# Patient Record
Sex: FEMALE | Race: ASIAN | NOT HISPANIC OR LATINO | Employment: UNEMPLOYED | URBAN - METROPOLITAN AREA
[De-identification: names, ages, dates, MRNs, and addresses within clinical notes are randomized per-mention and may not be internally consistent; named-entity substitution may affect disease eponyms.]

---

## 2019-08-02 ENCOUNTER — OFFICE VISIT (OUTPATIENT)
Dept: FAMILY MEDICINE | Facility: CLINIC | Age: 49
End: 2019-08-02
Payer: COMMERCIAL

## 2019-08-02 VITALS
OXYGEN SATURATION: 99 % | DIASTOLIC BLOOD PRESSURE: 67 MMHG | TEMPERATURE: 98.4 F | RESPIRATION RATE: 18 BRPM | HEART RATE: 73 BPM | HEIGHT: 56 IN | WEIGHT: 110.4 LBS | BODY MASS INDEX: 24.84 KG/M2 | SYSTOLIC BLOOD PRESSURE: 97 MMHG

## 2019-08-02 DIAGNOSIS — M25.512 CHRONIC PAIN OF BOTH SHOULDERS: ICD-10-CM

## 2019-08-02 DIAGNOSIS — M25.511 CHRONIC PAIN OF BOTH SHOULDERS: ICD-10-CM

## 2019-08-02 DIAGNOSIS — M54.2 NECK PAIN: Primary | ICD-10-CM

## 2019-08-02 DIAGNOSIS — G89.29 CHRONIC PAIN OF BOTH SHOULDERS: ICD-10-CM

## 2019-08-02 PROBLEM — F33.9 RECURRENT MAJOR DEPRESSIVE DISORDER (H): Status: ACTIVE | Noted: 2018-10-12

## 2019-08-02 RX ORDER — CYCLOBENZAPRINE HCL 5 MG
5 TABLET ORAL 3 TIMES DAILY PRN
Qty: 60 TABLET | Refills: 3 | Status: SHIPPED | OUTPATIENT
Start: 2019-08-02 | End: 2021-05-07

## 2019-08-02 RX ORDER — BUPROPION HYDROCHLORIDE 150 MG/1
150 TABLET ORAL
COMMUNITY
Start: 2019-05-20 | End: 2021-05-07

## 2019-08-02 RX ORDER — CITALOPRAM HYDROBROMIDE 20 MG/1
20 TABLET ORAL
COMMUNITY
Start: 2015-03-06 | End: 2021-05-07

## 2019-08-02 RX ORDER — NAPROXEN 250 MG/1
250 TABLET ORAL 2 TIMES DAILY WITH MEALS
Qty: 30 TABLET | Refills: 3 | Status: SHIPPED | OUTPATIENT
Start: 2019-08-02 | End: 2021-05-07

## 2019-08-02 RX ORDER — MULTIVITAMIN WITH IRON
1 TABLET ORAL
COMMUNITY
Start: 2015-03-06 | End: 2021-05-07

## 2019-08-02 ASSESSMENT — ENCOUNTER SYMPTOMS
CONSTIPATION: 1
HEARTBURN: 1
DEPRESSION: 1
MYALGIAS: 1
NECK PAIN: 1
HEADACHES: 1

## 2019-08-02 ASSESSMENT — MIFFLIN-ST. JEOR: SCORE: 981.02

## 2019-08-02 NOTE — NURSING NOTE
Due to patient being non-English speaking/uses sign language, an  was used for this visit. Only for face-to-face interpretation by an external agency, date and length of interpretation can be found on the scanned worksheet.     name: Lisa Hollingsworth  Agency: [employer]  Language: Germaine   Telephone number: [phone]  Type of interpretation: Face-to-face, spoken
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
## 2019-08-02 NOTE — PATIENT INSTRUCTIONS
Your pain is likely from muscle spasms. Start taking 5 mg flexeril up to three times daily for pain. You can also take 250 mg of Naproxen as needed for pain, make sure to take this with meals.     Schedule a follow up appointment with me in 4 weeks. We will discuss your pain and your mood at that time.             If you have any further concerns or wish to schedule another appointment, please call our office at [phone] during normal business hours (8AM-5PM, Monday-Friday)    Thank you for coming to [facility], I look forward to caring for you in the future!    Take care,    Dayne Connolly MD

## 2019-08-07 NOTE — PROGRESS NOTES
I have personally reviewed the history and examination as documented by Dr. Connolly.  I was present during key portions of the visit and agree with the assessment and plan as documented for 48 yr old female here for neck and bilateral shoulder pain. Imaging negative. Trial of meds. If no better, consider labs for PMR. Precautions given. Anticipatory guidance given.     Valentín Capps MD  August 7, 2019  3:29 PM

## 2019-09-04 ENCOUNTER — OFFICE VISIT (OUTPATIENT)
Dept: FAMILY MEDICINE | Facility: CLINIC | Age: 49
End: 2019-09-04
Payer: COMMERCIAL

## 2019-09-04 VITALS
HEIGHT: 56 IN | DIASTOLIC BLOOD PRESSURE: 64 MMHG | RESPIRATION RATE: 14 BRPM | WEIGHT: 109 LBS | OXYGEN SATURATION: 98 % | SYSTOLIC BLOOD PRESSURE: 93 MMHG | HEART RATE: 75 BPM | TEMPERATURE: 97.8 F | BODY MASS INDEX: 24.52 KG/M2

## 2019-09-04 DIAGNOSIS — G89.29 CHRONIC RIGHT SHOULDER PAIN: Primary | ICD-10-CM

## 2019-09-04 DIAGNOSIS — M75.21 BICEPS TENDONITIS, RIGHT: ICD-10-CM

## 2019-09-04 DIAGNOSIS — M25.511 CHRONIC RIGHT SHOULDER PAIN: Primary | ICD-10-CM

## 2019-09-04 LAB — ERYTHROCYTE [SEDIMENTATION RATE] IN BLOOD: 15 MM/HR (ref 0–20)

## 2019-09-04 ASSESSMENT — MIFFLIN-ST. JEOR: SCORE: 982.42

## 2019-09-04 NOTE — PROGRESS NOTES
HPI:       Lise Ross is a 48 year old  female with a past medical history of MDD and chronic right shoulder pain who presents for the following concern:    Right shoulder pain   - Currently on wellbutrin, celexa and flexeril   - Was seen on 8/2 for symptoms. X-ray performed of cervical spine and bilateral shoulders. Noted joint space narrowing of bilateral shoulders with osteophytes. Cervical spine with straightening.   - Recommended trial of flexeril and naproxen and if not improved ESR to rule out PMR  - This right shoulder pain has been present for about 10 years total   - Does have weakness present, though states it is as a result of pain when she tries to use it. Weakness has been going on some time   - Flexeril and naproxen given as last visit were somewhat helpful   - Has done PT in the past, but states that it was not helpful. Still does the exercises at home.   - Notes some weakness in upper legs when she gets up, unchanged over the years though     A [employer]  was used for this visit         PMHX:     Patient Active Problem List   Diagnosis     Recurrent major depressive disorder (H)     Screening for malignant neoplasm of cervix       Current Outpatient Medications   Medication Sig Dispense Refill     buPROPion (WELLBUTRIN XL) 150 MG 24 hr tablet Take 150 mg by mouth       citalopram (CELEXA) 20 MG tablet Take 20 mg by mouth       cyclobenzaprine (FLEXERIL) 5 MG tablet Take 1 tablet (5 mg) by mouth 3 times daily as needed for muscle spasms 60 tablet 3     Multiple vitamin  s TABS Take 1 tablet by mouth       naproxen (NAPROSYN) 250 MG tablet Take 1 tablet (250 mg) by mouth 2 times daily (with meals) 30 tablet 3       Social History     Tobacco Use     Smoking status: Never Smoker     Smokeless tobacco: Never Used   Substance Use Topics     Alcohol use: None     Drug use: None          Allergies   Allergen Reactions     Codeine Other (See Comments)     Trouble breathing, drowsy  Shaky  "and tired       Office Visit on 09/04/2019   Component Date Value Ref Range Status     Sed Rate 09/04/2019 15  0 - 20 mm/hr Final            Review of Systems:       10 point review of systems negative except for noted in HPI             Physical Exam:     Vitals:    09/04/19 1616   BP: 93/64   Pulse: 75   Resp: 14   Temp: 97.8  F (36.6  C)   TempSrc: Oral   SpO2: 98%   Weight: 49.4 kg (109 lb)   Height: 1.422 m (4' 8\")     Body mass index is 24.44 kg/m .    Exam:  Constitutional: healthy, alert and no distress  Neck: Neck supple. No adenopathy. Thyroid symmetric, normal size,  Cardiovascular: Regular rate and rhythm. No murmurs, clicks gallops or rub  Respiratory: Lungs clear to auscultation. No wheezing or crackles present   Abdomen:  Abdomen soft, non-tender. BS normal. No masses, organomegaly  Musculoskeletal: Full ROM of cervical spine. Spurling's negative. Significant decreased ROM both active and passive secondary to pain of right arm. No bony tenderness to palpation. Tenderness to palpation of proximal biceps tendon. Difficult to perform specific maneuvers due to diffuse pain with active movements   Psychiatric: mentation appears normal and affect normal/bright      Assessment and Plan     1. Chronic right shoulder pain  2. Biceps tendonitis, right  Chronic right shoulder pain. Difficult to get full exam given pain and resulting weakness, though did have significant tenderness to palpation over proximal biceps tendon. Recommended continuing naproxen with food and flexeril as needed for pain control given some relief. ESR obtained and wnl, findings also not consistent with PMR. Additionally question if mood disorder may be contributing as well. Regardless, feel she would benefit from trial of PT again and patient agreeable.   - PHYSICAL THERAPY REFERRAL  - Erythrocyte Sed Rate ([employer])    Follow up in 1 month as needed for recheck of shoulder pain     Options for treatment and follow-up care were reviewed " with the patient and/or guardian. Lise Ross and/or guardian engaged in the decision making process and verbalized understanding of the options discussed and agreed with the final plan.    Shirley Garcia DO (PGY3)   Pager #[phone]    Precepted today with: Katiuska Robles MD

## 2019-09-04 NOTE — NURSING NOTE
Due to patient being non-English speaking/uses sign language, an  was used for this visit. Only for face-to-face interpretation by an external agency, date and length of interpretation can be found on the scanned worksheet.     name: Tiffany Mancera  Agency: [employer]  Language: Emong   Telephone number: [phone]  Type of interpretation: Face-to-face, spoken

## 2019-09-04 NOTE — PROGRESS NOTES
Preceptor Attestation:   Patient seen, evaluated and discussed with the resident. I have verified the content of the note, which accurately reflects my assessment of the patient and the plan of care.  Supervising Physician:Katiuska Robles MD  [facility]

## 2019-09-05 ENCOUNTER — AMBULATORY - HEALTHEAST (OUTPATIENT)
Dept: ADMINISTRATIVE | Facility: REHABILITATION | Age: 49
End: 2019-09-05

## 2019-09-05 DIAGNOSIS — G89.29 CHRONIC RIGHT SHOULDER PAIN: ICD-10-CM

## 2019-09-05 DIAGNOSIS — M25.511 CHRONIC RIGHT SHOULDER PAIN: ICD-10-CM

## 2019-09-05 DIAGNOSIS — M75.21 BICEPS TENDINITIS, RIGHT: ICD-10-CM

## 2019-09-05 NOTE — PATIENT INSTRUCTIONS
Referral for :      Physical Therapy   LOCATION/PLACE/Provider :    [facility]   DATE & TIME :    Location will call patient  PHONE :     [phone]  FAX :    [phone]  Appointment made by clinic staff/:    Maryann

## 2019-09-18 ENCOUNTER — OFFICE VISIT (OUTPATIENT)
Dept: FAMILY MEDICINE | Facility: CLINIC | Age: 49
End: 2019-09-18
Payer: COMMERCIAL

## 2019-09-18 ENCOUNTER — AMBULATORY - HEALTHEAST (OUTPATIENT)
Dept: ADMINISTRATIVE | Facility: REHABILITATION | Age: 49
End: 2019-09-18

## 2019-09-18 VITALS
HEIGHT: 57 IN | BODY MASS INDEX: 23.3 KG/M2 | TEMPERATURE: 98 F | WEIGHT: 108 LBS | DIASTOLIC BLOOD PRESSURE: 64 MMHG | RESPIRATION RATE: 16 BRPM | HEART RATE: 67 BPM | OXYGEN SATURATION: 96 % | SYSTOLIC BLOOD PRESSURE: 99 MMHG

## 2019-09-18 DIAGNOSIS — G89.29 CHRONIC RIGHT SHOULDER PAIN: ICD-10-CM

## 2019-09-18 DIAGNOSIS — M25.511 CHRONIC RIGHT SHOULDER PAIN: Primary | ICD-10-CM

## 2019-09-18 DIAGNOSIS — M75.21 BICEPS TENDONITIS, RIGHT: ICD-10-CM

## 2019-09-18 DIAGNOSIS — M25.511 CHRONIC RIGHT SHOULDER PAIN: ICD-10-CM

## 2019-09-18 DIAGNOSIS — M75.01 ADHESIVE CAPSULITIS OF RIGHT SHOULDER: ICD-10-CM

## 2019-09-18 DIAGNOSIS — G89.29 CHRONIC RIGHT SHOULDER PAIN: Primary | ICD-10-CM

## 2019-09-18 ASSESSMENT — MIFFLIN-ST. JEOR: SCORE: 988.88

## 2019-09-18 NOTE — NURSING NOTE
"Chief Complaint   Patient presents with     Follow Up     bilateral but left is getting worse.      Medication Reconciliation     completed.        BP 99/64   Pulse 67   Temp 98  F (36.7  C) (Oral)   Resp 16   Ht 1.44 m (4' 8.69\")   Wt 49 kg (108 lb)   SpO2 96%   BMI 23.62 kg/m          Due to patient being non-English speaking/uses sign language, an  was used for this visit. Only for face-to-face interpretation by an external agency, date and length of interpretation can be found on the scanned worksheet.       name: Fayshandra Martinez  Language: Germaine  Agency: [employer]  Phone number: [phone]  Type of interpretation: Face to Face, spoken    "

## 2019-09-18 NOTE — PROGRESS NOTES
Preceptor Attestation:   Patient seen, evaluated and discussed with the resident. I have verified the content of the note, which accurately reflects my assessment of the patient and the plan of care.  Supervising Physician:Donnie Young MD  [facility]

## 2019-09-18 NOTE — PROGRESS NOTES
HPI:       Lise Ross is a 48 year old  female with a past medical history of MDD and chronic right shoulder pain who presents for the following concern:    Right shoulder pain   - Currently on wellbutrin, celexa and flexeril for medication management of shoulder pain   - Was seen by myself on 9/4 for these symptoms, felt to be partially secondary to biceps tendonitis   - ESR obtained to rule out PMR, and negative   - Recommended return to PT, unfortunately patient did not call to schedule   - Shoulder has not improved since last seen. Has not been doing exercises or stretches   - Describes pain as primarily in her anterior shoulder and worse with movement. Pain is worse at night   - Has been doing massage and acupuncture, which have not been helpful     A [employer]  was used for this visit         PMHX:     Patient Active Problem List   Diagnosis     Recurrent major depressive disorder (H)     Screening for malignant neoplasm of cervix       Current Outpatient Medications   Medication Sig Dispense Refill     buPROPion (WELLBUTRIN XL) 150 MG 24 hr tablet Take 150 mg by mouth       citalopram (CELEXA) 20 MG tablet Take 20 mg by mouth       cyclobenzaprine (FLEXERIL) 5 MG tablet Take 1 tablet (5 mg) by mouth 3 times daily as needed for muscle spasms 60 tablet 3     Multiple vitamin  s TABS Take 1 tablet by mouth       naproxen (NAPROSYN) 250 MG tablet Take 1 tablet (250 mg) by mouth 2 times daily (with meals) 30 tablet 3       Social History     Tobacco Use     Smoking status: Never Smoker     Smokeless tobacco: Never Used   Substance Use Topics     Alcohol use: None     Drug use: None       Allergies   Allergen Reactions     Codeine Other (See Comments)     Trouble breathing, drowsy  Shaky and tired              Review of Systems:       10 point review of systems negative except for noted in HPI             Physical Exam:     Vitals:    09/18/19 1337   BP: 99/64   Pulse: 67   Resp: 16   Temp: 98  F  "(36.7  C)   TempSrc: Oral   SpO2: 96%   Weight: 49 kg (108 lb)   Height: 1.44 m (4' 8.69\")     Body mass index is 23.62 kg/m .    Exam:  Constitutional: healthy, alert and no distress  Cardiovascular: Regular rate and rhythm. No murmurs, clicks gallops or rub  Respiratory: Lungs clear to auscultation. No wheezing or crackles present   Musculoskeletal: Full ROM of cervical spine. Spurling's negative. Significant decreased ROM both active and passive secondary to pain of right arm. No bony tenderness to palpation. Tenderness to palpation of proximal biceps tendon. Difficult to perform specific maneuvers due to diffuse pain with active movements   Psychiatric: mentation appears normal and affect normal/bright    Assessment and Plan     1. Chronic right shoulder pain  2. Biceps tendonitis, right  3. Adhesive capsulitis of right shoulder  Significant decreased ROM secondary to pain both active and passive concerning for adhesive capsulitis. Has had negative x-ray shoulder and cervical spine which were negative. Also with significant tenderness over proximal biceps tendon, so this may be contributing to symptoms as well, however difficult to get full assessment given significant pain with movement. Recommended referral to PT. No radicular symptoms or other red flag symptoms.   - PHYSICAL THERAPY REFERRAL    Follow up in 1 month after PT. If no improvement of pain, may benefit from seeing sports medicine physician    Options for treatment and follow-up care were reviewed with the patient and/or guardian. Lise Ross and/or guardian engaged in the decision making process and verbalized understanding of the options discussed and agreed with the final plan.    Shirley Garcia DO (PGY3)  [facility] Resident  Pager: [phone]      Precepted today with: Donnie Young MD          "

## 2019-09-18 NOTE — PATIENT INSTRUCTIONS
Referral for :     Physical Therapy    LOCATION/PLACE/Provider :    [facility]  DATE & TIME :    Location will call the patient   PHONE :     [phone]  FAX :    [phone]  Appointment made by clinic staff/:    Maryann

## 2021-03-11 ENCOUNTER — OFFICE VISIT (OUTPATIENT)
Dept: FAMILY MEDICINE | Facility: CLINIC | Age: 51
End: 2021-03-11
Payer: COMMERCIAL

## 2021-03-11 VITALS
WEIGHT: 106.6 LBS | OXYGEN SATURATION: 97 % | RESPIRATION RATE: 18 BRPM | HEIGHT: 57 IN | BODY MASS INDEX: 23 KG/M2 | HEART RATE: 79 BPM | DIASTOLIC BLOOD PRESSURE: 68 MMHG | TEMPERATURE: 97.4 F | SYSTOLIC BLOOD PRESSURE: 116 MMHG

## 2021-03-11 DIAGNOSIS — Z12.11 SCREENING FOR COLON CANCER: ICD-10-CM

## 2021-03-11 DIAGNOSIS — K59.01 SLOW TRANSIT CONSTIPATION: Primary | ICD-10-CM

## 2021-03-11 DIAGNOSIS — R10.84 DIFFUSE ABDOMINAL PAIN: ICD-10-CM

## 2021-03-11 PROCEDURE — 99213 OFFICE O/P EST LOW 20 MIN: CPT | Mod: GC | Performed by: STUDENT IN AN ORGANIZED HEALTH CARE EDUCATION/TRAINING PROGRAM

## 2021-03-11 RX ORDER — POLYETHYLENE GLYCOL 3350 17 G/17G
1 POWDER, FOR SOLUTION ORAL DAILY PRN
Qty: 507 G | Refills: 1 | Status: SHIPPED | OUTPATIENT
Start: 2021-03-11 | End: 2021-05-07

## 2021-03-11 RX ORDER — CALCIUM CARBONATE 500 MG/1
1 TABLET, CHEWABLE ORAL 3 TIMES DAILY PRN
Qty: 120 TABLET | Refills: 1 | Status: SHIPPED | OUTPATIENT
Start: 2021-03-11 | End: 2021-06-21

## 2021-03-11 SDOH — HEALTH STABILITY: MENTAL HEALTH: HOW OFTEN DO YOU HAVE A DRINK CONTAINING ALCOHOL?: NEVER

## 2021-03-11 SDOH — HEALTH STABILITY: MENTAL HEALTH: HOW OFTEN DO YOU HAVE 6 OR MORE DRINKS ON ONE OCCASION?: NEVER

## 2021-03-11 SDOH — HEALTH STABILITY: MENTAL HEALTH: HOW MANY STANDARD DRINKS CONTAINING ALCOHOL DO YOU HAVE ON A TYPICAL DAY?: NOT ASKED

## 2021-03-11 ASSESSMENT — MIFFLIN-ST. JEOR: SCORE: 972.53

## 2021-03-11 NOTE — NURSING NOTE
Due to patient being non-English speaking/uses sign language, an  was used for this visit. Only for face-to-face interpretation by an external agency, date and length of interpretation can be found on the scanned worksheet.     name: [id number] Mayra   Agency: [employer]  Language: mEong   Telephone number: IPAD  Type of interpretation: IPAD

## 2021-03-11 NOTE — PATIENT INSTRUCTIONS
Please talk with your daughter about the colonoscopy and call us with what you decide.      Patient Education     Treating Constipation    Constipation is a common and often uncomfortable problem. Constipation means you have bowel movements fewer than 3 times per week. Or that you strain to pass hard, dry stool. It can last a short time. Or it can be a problem that never seems to go away. The good news is that it can often be treated and controlled.  Eat more fiber  One of the best ways to help treat constipation is to increase your fiber intake. You can do this either through diet or by using fiber supplements. Fiber (in whole grains, fruits, and vegetables) adds bulk and absorbs water to soften the stool. This helps the stool pass through the colon more easily. When you increase your fiber intake, do it slowly to prevent side effects such as bloating. Also increase the amount of water that you drink. Eating more of these foods can add fiber to your diet:    High-fiber cereals    Whole grains, bran, and brown rice    Vegetables such as carrots, broccoli, and greens    Fresh fruits (especially apples, pears, and dried fruits such as raisins and apricots)    Nuts and legumes (especially beans such as lentils, kidney beans, and lima beans)  Get physically active  Exercise helps improve the working of your colon which helps ease constipation. Try to get some physical activity every day. If you haven t been active for a while, talk with your healthcare provider before starting again.  Laxatives  Your healthcare provider may suggest an over-the-counter product to help ease your constipation. He or she may suggest the use of bulk-forming agents or laxatives. Laxatives, if used as directed, are common and safe. Follow directions carefully when using them. See your provider for new-onset constipation, or long-term constipation, to rule out other causes such as medicines or thyroid disease.  Jose last reviewed this  educational content on 6/1/2019 2000-2020 [employer]. All rights reserved. This information is not intended as a substitute for professional medical care. Always follow your healthcare professional's instructions.

## 2021-03-11 NOTE — PROGRESS NOTES
"  Subjective:   Lise Ross is a 50 year old year old female who presents to clinic today for the following health issues:    ABDOMINAL PAIN     Onset: 1 year, worsening over the past month    Description:   Character: constant, \"hot\", \"pressure\". Feels a \"lump\" when she presses that moves from one place to another  Location: diffusely across the abdomen  Radiation: Back and Pelvic region    Intensity: moderate to severe severe    Progression of Symptoms:  worsening    Accompanying Signs & Symptoms:  Fever/Chills?: no   Gas/Bloating: YES  Nausea: no   Vomitting: no   Diarrhea?: no   Constipation: yes - describes passing hard balls of stool  Dysuria or Hematuria: no    History:   Trauma: No  Surgery: cholecystectomy and   Previous similar pain: no    Previous tests done: none    Precipitating factors:   Does the pain change with:     Food: YES- some foods cause more pain and burning sensation (spicy and sour foods, beef and chicken)    BM: no     Urination: no     Alleviating factors: massaging the abdomen, medicine (unsure what is is called, was given to her by a friend)    LMP: No LMP recorded. Patient is postmenopausal.     A Critter[employer]  was used for this visit. Yodit Fierro [id number])    PMHX:     Patient Active Problem List   Diagnosis     Recurrent major depressive disorder (H)     Screening for malignant neoplasm of cervix       Current Outpatient Medications   Medication Sig Dispense Refill     buPROPion (WELLBUTRIN XL) 150 MG 24 hr tablet Take 150 mg by mouth       citalopram (CELEXA) 20 MG tablet Take 20 mg by mouth       cyclobenzaprine (FLEXERIL) 5 MG tablet Take 1 tablet (5 mg) by mouth 3 times daily as needed for muscle spasms 60 tablet 3     Multiple vitamin  s TABS Take 1 tablet by mouth       naproxen (NAPROSYN) 250 MG tablet Take 1 tablet (250 mg) by mouth 2 times daily (with meals) 30 tablet 3       Social History     Tobacco Use     Smoking status: Never Smoker     Smokeless " "tobacco: Never Used   Substance Use Topics     Alcohol use: Never     Frequency: Never     Binge frequency: Never     Drug use: Never     Allergies   Allergen Reactions     Codeine Other (See Comments)     Trouble breathing, drowsy  Shaky and tired       Review of Systems:     ROS otherwise negative unless stated above.     Objective:     /68 (BP Location: Right arm, Patient Position: Sitting, Cuff Size: Adult Regular)   Pulse 79   Temp 97.4  F (36.3  C) (Oral)   Resp 18   Ht 1.44 m (4' 8.69\")   Wt 48.4 kg (106 lb 9.6 oz)   SpO2 97%   BMI 23.32 kg/m    Body mass index is 23.32 kg/m .  GENERAL APPEARANCE: healthy, alert and no distress  EYES: Eyes grossly normal to inspection  RESP: lungs clear to auscultation - no rales, rhonchi or wheezes  CV: regular rate and rhythm,  and no murmur, click,  rub or gallop  ABDOMEN: soft, non-distended abdomen with mild tenderness to deep palpation with palpable fullness along the colon  MS: extremities normal- no gross deformities noted  SKIN: no suspicious lesions or rashes  NEURO: Normal gait, sensory exam grossly normal, mentation appears intact and speech normal  PSYCH: mood and affect normal/bright    Assessment & Plan:     1. Slow transit constipation  2. Diffuse abdominal pain  Based on her history of hard stool and description of a palpable \"lump\" that moves across her abdomen, her symptoms seem most consistent with constipation. The slight fullness felt at the site of the colon on exam would also fit with a stool ball. Discussed the importance of drinking adequate water and getting regular physical activity in addition to a trial of Miralax. Many of her symptoms fit with heartburn as well so will have her try PRN Tums for her symptoms. Follow up PRN if symptoms persist.  - polyethylene glycol (MIRALAX) 17 GM/Dose powder; Take 17 g (1 capful) by mouth daily as needed for constipation  Dispense: 507 g; Refill: 1  - calcium carbonate (TUMS) 500 MG chewable " tablet; Take 1 tablet (500 mg) by mouth 3 times daily as needed for heartburn  Dispense: 120 tablet; Refill: 1    3. Screening for colon cancer  Due for age appropriate colorectal cancer screening. Discussed that a colonoscopy would also be able to ascertain whether there is an intraluminal mass/polyp that is contributing to her symptoms. She would like to discuss with her daughter before decided whether or not to move forward with colonoscopy.  - GASTROENTEROLOGY ADULT REF PROCEDURE ONLY; Future    Options for treatment and follow-up care were reviewed with the patient and/or guardian. Lise Castro Cody and/or guardian engaged in the decision making process and verbalized understanding of the options discussed and agreed with the final plan.    Maritza Rodarte MD  [facility] Medicine Resident    Precepted with: Keerthi Hernández DO

## 2021-03-12 NOTE — PROGRESS NOTES
Preceptor Attestation:   Patient seen, evaluated and discussed with the resident. I have verified the content of the note, which accurately reflects my assessment of the patient and the plan of care.  Supervising Physician:Keerthi Hernández DO [facility]

## 2021-04-30 ENCOUNTER — AMBULATORY - HEALTHEAST (OUTPATIENT)
Dept: NURSING | Facility: CLINIC | Age: 51
End: 2021-04-30

## 2021-05-07 ENCOUNTER — OFFICE VISIT (OUTPATIENT)
Dept: FAMILY MEDICINE | Facility: CLINIC | Age: 51
End: 2021-05-07
Payer: COMMERCIAL

## 2021-05-07 VITALS
HEART RATE: 79 BPM | RESPIRATION RATE: 18 BRPM | DIASTOLIC BLOOD PRESSURE: 67 MMHG | WEIGHT: 110 LBS | SYSTOLIC BLOOD PRESSURE: 102 MMHG | OXYGEN SATURATION: 100 % | BODY MASS INDEX: 24.75 KG/M2 | HEIGHT: 56 IN

## 2021-05-07 DIAGNOSIS — Z11.4 SCREENING FOR HIV (HUMAN IMMUNODEFICIENCY VIRUS): ICD-10-CM

## 2021-05-07 DIAGNOSIS — Z12.31 ENCOUNTER FOR SCREENING MAMMOGRAM FOR BREAST CANCER: ICD-10-CM

## 2021-05-07 DIAGNOSIS — F41.9 ANXIETY: ICD-10-CM

## 2021-05-07 DIAGNOSIS — R53.83 FATIGUE, UNSPECIFIED TYPE: ICD-10-CM

## 2021-05-07 DIAGNOSIS — Z00.00 ROUTINE GENERAL MEDICAL EXAMINATION AT A HEALTH CARE FACILITY: Primary | ICD-10-CM

## 2021-05-07 DIAGNOSIS — Z13.220 SCREENING FOR HYPERLIPIDEMIA: ICD-10-CM

## 2021-05-07 DIAGNOSIS — Z83.3 FAMILY HISTORY OF DIABETES MELLITUS: ICD-10-CM

## 2021-05-07 DIAGNOSIS — Z11.59 NEED FOR HEPATITIS C SCREENING TEST: ICD-10-CM

## 2021-05-07 LAB
ERYTHROCYTE [DISTWIDTH] IN BLOOD BY AUTOMATED COUNT: 11.6 % (ref 10–15)
HBA1C MFR BLD: 5.3 % (ref 4.1–5.7)
HCT VFR BLD AUTO: 36.4 % (ref 35–47)
HEMOGLOBIN: 12.5 G/DL (ref 11.7–15.7)
MCH RBC QN AUTO: 30.5 PG (ref 26.5–35)
MCHC RBC AUTO-ENTMCNC: 34.3 G/DL (ref 32–36)
MCV RBC AUTO: 88.8 FL (ref 78–100)
PLATELET # BLD AUTO: 204 10E9/L (ref 150–450)
RBC # BLD AUTO: 4.1 10E12/L (ref 3.8–5.2)
TSH SERPL DL<=0.05 MIU/L-ACNC: 0.7 UIU/ML (ref 0.3–5)
WBC # BLD AUTO: 4.6 10E9/L (ref 4–11)

## 2021-05-07 PROCEDURE — 85027 COMPLETE CBC AUTOMATED: CPT | Performed by: FAMILY MEDICINE

## 2021-05-07 PROCEDURE — 83036 HEMOGLOBIN GLYCOSYLATED A1C: CPT | Performed by: FAMILY MEDICINE

## 2021-05-07 PROCEDURE — 36415 COLL VENOUS BLD VENIPUNCTURE: CPT | Performed by: FAMILY MEDICINE

## 2021-05-07 PROCEDURE — 99396 PREV VISIT EST AGE 40-64: CPT | Performed by: FAMILY MEDICINE

## 2021-05-07 RX ORDER — POLYETHYLENE GLYCOL 3350 17 G/17G
17 POWDER, FOR SOLUTION ORAL DAILY PRN
COMMUNITY
Start: 2021-03-11 | End: 2021-10-18

## 2021-05-07 ASSESSMENT — MIFFLIN-ST. JEOR: SCORE: 981.71

## 2021-05-07 NOTE — PATIENT INSTRUCTIONS
- Follow-up for your tetanus vaccine 2 weeks or more after the COVID-19 vaccine.   - Schedule your mammogram at your earliest convenience.   - We will call you with the results of your blood work.   - Please set up another visit to discuss the next steps in your anxiety and sleep issues.       Preventive Health Recommendations  Female Ages 50 - 64    Yearly exam: See your health care provider every year in order to  o Review health changes.   o Discuss preventive care.    o Review your medicines if your doctor has prescribed any.      Get a Pap test every three years (unless you have an abnormal result and your provider advises testing more often).    If you get Pap tests with HPV test, you only need to test every 5 years, unless you have an abnormal result.     You do not need a Pap test if your uterus was removed (hysterectomy) and you have not had cancer.    You should be tested each year for STDs (sexually transmitted diseases) if you're at risk.     Have a mammogram every 1 to 2 years.    Have a colonoscopy at age 50, or have a yearly FIT test (stool test). These exams screen for colon cancer.      Have a cholesterol test every 5 years, or more often if advised.    Have a diabetes test (fasting glucose) every three years. If you are at risk for diabetes, you should have this test more often.     If you are at risk for osteoporosis (brittle bone disease), think about having a bone density scan (DEXA).    Shots: Get a flu shot each year. Get a tetanus shot every 10 years.    Nutrition:     Eat at least 5 servings of fruits and vegetables each day.    Eat whole-grain bread, whole-wheat pasta and brown rice instead of white grains and rice.    Get adequate Calcium and Vitamin D.     Lifestyle    Exercise at least 150 minutes a week (30 minutes a day, 5 days a week). This will help you control your weight and prevent disease.    Limit alcohol to one drink per day.    No smoking.     Wear sunscreen to prevent skin  cancer.     See your dentist every six months for an exam and cleaning.    See your eye doctor every 1 to 2 years.  Referral for :     Mammogram    LOCATION/PLACE/Provider :    [facility]   DATE & TIME :    Faxed, location will reach out   PHONE :     [phone]  FAX :    [phone]  Appointment made by clinic staff/:    Maryann

## 2021-05-07 NOTE — PROGRESS NOTES
Female Physical Note    Due to language barrier, an  was present during the history-taking and subsequent discussion (and for part of the physical exam) with this patient.  A [employer]  was used for this visit.    Concerns today:     She had her cholesterol checked at another clinic, [facility], and they didn't start a medication. They recommended lifestyle changes and to recheck it in a year.     FH of diabetes in her mother. Would like to be screened.     History of iron deficiency anemia. Last checked 4-5 years ago and no longer taking iron supplements. Feels fatigued a lot. Very low energy. She is not sleeping well. Gradual onset of fatigue. She is post-menopausal for 2-3 years. Hot flashes related to this transition and its a bit better recently. She has a history of anxiety and thinks that this might be the cause of her symptoms. Previously treated with citalopram and bupropion with minimal benefit. Is taking melatonin, which works sometimes. She frequently wakes up in the middle of the night, though, and feels very anxious.    Sometimes she has a strange heart sensation and she's not sure what is causing. She hasn't noticed it happening at any particular time. Sometimes it's once per month, sometimes it is more often. No known triggers - doesn't seem to be exertional in nature.  It feels like an irritated pain, not like heartburn. It tends to last for seconds. No palpitations. No associated shortness of breath. She hasn't noticed if it is reproducible. No known FH of early heart disease.     ROS:   CONSTITUTIONAL: Fatigue as above, no unexpected change in weight  SKIN: Dry skin, no worrisome rashes, no worrisome moles, no worrisome lesions  EYES: no acute vision problems or changes  ENT: no ear problems, no mouth problems, no throat problems  RESP: no significant cough, no shortness of breath  CV: See HPI. no palpitations, no new or worsening peripheral edema, orthopnea, PND.  GI: no  nausea, no vomiting, no constipation, no diarrhea    Sexually Active: No  Sexual concerns: No  Contraception:not needed    Menarche:No LMP recorded. Patient is postmenopausal.  STD History: Neg  Last Pap Smear Date:  was normal with negative HPV (see problem list)  Abnormal Pap History: None    Patient Active Problem List   Diagnosis     Recurrent major depressive disorder (H)     Screening for malignant neoplasm of cervix       Current Outpatient Medications   Medication Sig Dispense Refill     calcium carbonate (TUMS) 500 MG chewable tablet Take 1 tablet (500 mg) by mouth 3 times daily as needed for heartburn 120 tablet 1       Past Medical History:   Diagnosis Date     Depressive disorder         Family History     Problem (# of Occurrences) Relation (Name,Age of Onset)    Cancer (1) Mother    Diabetes (1) Mother       Negative family history of: Hypertension, Albinism, Heart Disease, Coronary Artery Disease, Asthma          Problem List Medication List and Allergy List were reviewed.    Patient is an established patient of this clinic..    Social History     Tobacco Use     Smoking status: Never Smoker     Smokeless tobacco: Never Used   Substance Use Topics     Alcohol use: Never     Frequency: Never     Binge frequency: Never     Single  Children ? Yes, 4 kids. Lives with her youngest child. Not working outside the home. Has grandchildren in [place].     Has anyone hurt you physically, for example by pushing, hitting, slapping or kicking you or forcing you to have sex? Denies  Do you feel threatened or controlled by a partner, ex-partner or anyone in your life? Denies    RISK BEHAVIORS AND HEALTHY HABITS:  Tobacco Use/Smoking: None  Illicit Drug Use: None  Do you use alcohol? No  Diet (5-7 servings of fruits/veg daily): Yes   Exercise (30 min accumulated most days):No  Dental Care: No   Calcium 1500 mg/d:  No  Seat Belt Use: Yes     Cholesterol Level (>44 yo or at risk):  Recommended and patient  "accepted testing., Pap/HPV cotest every 5 years for women 30-65   Date done 2017  Result(s) NIL with neg. HPV, next due in 2022 and HIV screening:  Recommended and patient accepted testing.  Colon CA Screening (>50-75 ):  Recommended and patient accepted testing. and Breast CA Screening (>39 yo or 10 y before 1st degree relative diagnosis): Recommended and patient accepted testing.      Immunization History   Administered Date(s) Administered     COVID-19,PF,Pfizer 04/30/2021     MMR 08/01/1988     OPV, trivalent, live 08/01/1988, 10/19/1988, 05/08/1989     TD (ADULT, 7+) 08/01/1988, 10/19/1988, 05/08/1989    Reviewed Immunization Record Today    EXAMINATION:   /67   Pulse 79   Resp 18   Ht 1.43 m (4' 8.3\")   Wt 49.9 kg (110 lb)   SpO2 100%   BMI 24.40 kg/m    GENERAL: healthy, alert and no distress  EYES: Eyes grossly normal to inspection, extraocular movements - intact, and PERRL  HENT: ear canals- normal; TMs- normal; Nose- normal; Mouth- no ulcers, no lesions  NECK: no tenderness, no adenopathy, no asymmetry, no masses, no stiffness; thyroid- normal to palpation  RESP: lungs clear to auscultation - no rales, no rhonchi, no wheezes  BREAST: no masses, no tenderness, no nipple discharge, no palpable axillary masses or adenopathy  CV: regular rates and rhythm, normal S1 S2, no S3 or S4 and no murmur, no click or rub -  ABDOMEN: soft, no tenderness, no  hepatosplenomegaly, no masses, normal bowel sounds  MS: extremities- no gross deformities noted, no edema  SKIN: no suspicious lesions, no rashes  NEURO: strength and tone- normal, sensory exam- grossly normal, mentation- intact, speech- normal  BACK: no CVA tenderness, no paralumbar tenderness  - deferred, no concerns  RECTAL- deferred, no concerns  PSYCH: Alert and oriented times 3; speech- coherent , normal rate and volume; able to articulate logical thoughts, able to abstract reason, no tangential thoughts, no hallucinations or delusions, affect- " normal  LYMPHATICS: ant. cervical- normal, post. cervical- normal, axillary- normal, supraclavicular- normal    ASSESSMENT/PLAN:  1. Routine general medical examination at a health care facility  Here for a routine physical exam. Due for the following preventive screening tests today. Colonoscopy previously ordered in 2021 (see visit from March).  - Orders as below.     2. Encounter for screening mammogram for breast cancer  Due for routine screening mammography. Normal breast exam today.  - MA SCREENING DIGITAL BILAT; Future    3. Screening for HIV (human immunodeficiency virus)  Low risk. Routine screening.  - HIV Screening; Future    4. Need for hepatitis C screening test  Low risk. Routine screening.  - Hepatitis C Screen Reflex to HCV RNA Quant and Genotype; Future    5. Screening for hyperlipidemia  Previous abnormal lipid panel at outside facility. Has worked on lifestyle factors. Will repeat today.   - Lipid panel reflex to direct LDL Fasting; Future    6. Family history of diabetes mellitus  Mother with DM II. No symptoms of concern. BMI in normal range.  - Hemoglobin A1c ([facility])    7. Fatigue, unspecified type  Associated insomnia, suspect contributions from july- and post-menopausal hormonal fluctuations and anxiety. Lab work-up today. Continue melatonin. Consider starting selective serotonin reuptake inhibitor in follow-up - discussed briefly today.   - CBC with Plt (P )  - Vitamin D 25-Hydroxy ([facility])  - TSH  Sensitive ([facility])    8. Anxiety:   Longstanding anxiety symptoms. Presentation sounds more like panic attacks but did not have adequate time to characterize today. Has been treated with citalopram and bupropion previously for comorbid depression symptoms.   - Briefly discussed medication management and potential benefits for sleep.   - Close follow-up after lab work-up above to better characterize symptoms and to explore potential treatment options.    Other preventive health  maintenance:  Due for Tdap and Zoster vaccines but in the middle of the COVID vaccine series. Will defer for follow-up visit.    Elicia Kraus MD

## 2021-05-10 ENCOUNTER — RECORDS - HEALTHEAST (OUTPATIENT)
Dept: SCHEDULING | Facility: CLINIC | Age: 51
End: 2021-05-10

## 2021-05-10 ENCOUNTER — RECORDS - HEALTHEAST (OUTPATIENT)
Dept: ADMINISTRATIVE | Facility: OTHER | Age: 51
End: 2021-05-10

## 2021-05-10 DIAGNOSIS — Z12.31 VISIT FOR SCREENING MAMMOGRAM: ICD-10-CM

## 2021-05-10 DIAGNOSIS — Z13.220 SCREENING FOR HYPERLIPIDEMIA: ICD-10-CM

## 2021-05-10 DIAGNOSIS — Z11.4 SCREENING FOR HIV (HUMAN IMMUNODEFICIENCY VIRUS): Primary | ICD-10-CM

## 2021-05-10 DIAGNOSIS — Z11.59 NEED FOR HEPATITIS C SCREENING TEST: ICD-10-CM

## 2021-05-10 LAB
25(OH)D3 SERPL-MCNC: 22.7 NG/ML (ref 30–80)
CHOLEST SERPL-MCNC: 186 MG/DL
FASTING?: NO
HDLC SERPL-MCNC: 42 MG/DL
HIV 1+2 AB+HIV1 P24 AG SERPL QL IA: NEGATIVE
LDLC SERPL CALC-MCNC: 88 MG/DL
TRIGL SERPL-MCNC: 280 MG/DL

## 2021-05-10 PROCEDURE — 36415 COLL VENOUS BLD VENIPUNCTURE: CPT | Performed by: FAMILY MEDICINE

## 2021-05-10 NOTE — PROGRESS NOTES
Labs ordered incorrectly at physical last week. Can we please call to see if these can be added on to the sample that Lise provided at [facility] now that they are ordered correctly? If not, we can follow-up at her next visit.     Thanks!    Elicia Kruas MD

## 2021-05-11 ENCOUNTER — TELEPHONE (OUTPATIENT)
Dept: FAMILY MEDICINE | Facility: CLINIC | Age: 51
End: 2021-05-11

## 2021-05-11 DIAGNOSIS — R74.8 LOW SERUM HDL: ICD-10-CM

## 2021-05-11 DIAGNOSIS — E55.9 HYPOVITAMINOSIS D: Primary | ICD-10-CM

## 2021-05-11 LAB — HCV AB SER QL: NEGATIVE

## 2021-05-11 RX ORDER — SWAB
2 SWAB, NON-MEDICATED MISCELLANEOUS DAILY
Qty: 60 CAPSULE | Refills: 11 | Status: SHIPPED | OUTPATIENT
Start: 2021-05-11 | End: 2022-05-06

## 2021-05-11 NOTE — TELEPHONE ENCOUNTER
"Can we please call Lise to relay the following results and recommendations:    1. Her screening test for diabetes was normal/negative.   2. Her tests for HIV and Hepatitis C were negative/normal as expected.   3. Her cholesterol testing is slightly abnormal. Her triglycerides are elevated and her \"good\" cholesterol, or HDL, is low. We can talk in follow-up about ways to improve her cholesterol levels.   4. She does not have anemia or any abnormalities with her thyroid function to explain her fatigue. However, her vitamin D level is low, which may be contributing to her low energy. I recommend starting a daily supplement of 800 international unit(s) of cholecalciferol (a type of vitamin D) and we can recheck this later in the year. I sent this prescription to her pharmacy.     Thanks!    Elicia Kraus MD         "

## 2021-05-13 NOTE — TELEPHONE ENCOUNTER
Called pt via language [employer], Natali ID#[id number]. Gave results to patient and has a follow-up already scheduled for 6/20/2021.

## 2021-05-21 ENCOUNTER — AMBULATORY - HEALTHEAST (OUTPATIENT)
Dept: NURSING | Facility: CLINIC | Age: 51
End: 2021-05-21

## 2021-05-30 ENCOUNTER — RECORDS - HEALTHEAST (OUTPATIENT)
Dept: ADMINISTRATIVE | Facility: CLINIC | Age: 51
End: 2021-05-30

## 2021-06-21 ENCOUNTER — OFFICE VISIT (OUTPATIENT)
Dept: FAMILY MEDICINE | Facility: CLINIC | Age: 51
End: 2021-06-21
Payer: COMMERCIAL

## 2021-06-21 VITALS
WEIGHT: 110 LBS | HEIGHT: 56 IN | HEART RATE: 65 BPM | TEMPERATURE: 98 F | OXYGEN SATURATION: 98 % | SYSTOLIC BLOOD PRESSURE: 97 MMHG | DIASTOLIC BLOOD PRESSURE: 66 MMHG | BODY MASS INDEX: 24.75 KG/M2 | RESPIRATION RATE: 18 BRPM

## 2021-06-21 DIAGNOSIS — E55.9 HYPOVITAMINOSIS D: ICD-10-CM

## 2021-06-21 DIAGNOSIS — R06.09 DYSPNEA ON EXERTION: ICD-10-CM

## 2021-06-21 DIAGNOSIS — R74.8 LOW SERUM HDL: Primary | ICD-10-CM

## 2021-06-21 DIAGNOSIS — Z00.00 HEALTHCARE MAINTENANCE: ICD-10-CM

## 2021-06-21 PROCEDURE — 99215 OFFICE O/P EST HI 40 MIN: CPT | Performed by: FAMILY MEDICINE

## 2021-06-21 ASSESSMENT — MIFFLIN-ST. JEOR: SCORE: 981.71

## 2021-06-21 ASSESSMENT — PATIENT HEALTH QUESTIONNAIRE - PHQ9: SUM OF ALL RESPONSES TO PHQ QUESTIONS 1-9: 6

## 2021-06-21 NOTE — NURSING NOTE
Due to patient being non-English speaking/uses sign language, an  was used for this visit. Only for face-to-face interpretation by an external agency, date and length of interpretation can be found on the scanned worksheet.     name: Alexys Barbosa  Agency: [employer]  Language: Germaine   Telephone number: [phone]  Type of interpretation: Telephone, spoken

## 2021-06-21 NOTE — PATIENT INSTRUCTIONS
- Work on gradually increasing exercise to 30-40 minutes, 4-5 days per week.   - If you start to develop worsening or more frequent shortness of breath or chest tightness while increasing your activity, follow up immediately.   - Let's repeat your cholesterol test with a fasting sample in a few months to recheck this.     Patient Education     Lifestyle Changes to Control Cholesterol  You can control your cholesterol through diet, exercise, weight management, quitting smoking, stress management, and taking your medicines right. These things can also lower your risk for cardiovascular disease.     Eating healthy  Your healthcare provider will give you information on diet changes you may need to make. Your provider may recommend that you see a registered dietitian for help with diet changes. Changes may include:     Cutting back on the amount of fat and cholesterol in your meals    Eating less salt (sodium). This is especially important if you also have high blood pressure.    Eating more fresh vegetables and fruits    Eating lean proteins such as fish, poultry, beans, and peas    Eating less red meat and processed meats    Using low-fat dairy products    Using vegetable and nut oils in limited amounts    Limiting how many sweets and processed foods like chips, cookies, and baked goods that you eat     Limiting how many sugar-sweetened beverages you drink    Limiting how often you eat out    Limiting alcohol  Getting exercise  Regular exercise is a good way to help your body control cholesterol. Regular exercise can help in many ways. It can:     Raise your good cholesterol    Help lower your bad cholesterol    Let blood flow better through your body    Give more oxygen to your muscles and tissues    Help you manage your weight    Help your heart pump better    Lower your blood pressure  Your healthcare provider may recommend that you get more physical activity if you haven't been active. Your provider may recommend  that you get moderate to vigorous physical activity for at least 40 minutes each day. You should do this for at least 3 to 4 days each week. A few examples of moderate to vigorous activity are:     Walking at a brisk pace. This is about 3 to 4 miles per hour.    Jogging or running    Swimming or water aerobics    Hiking    Dancing    Martial arts    Tennis    Riding a bicycle or stationary bike    Dancing  Managing your weight  If you are overweight or obese, your healthcare provider will work with you to help you lose weight and lower your BMI (body mass index). Making diet changes and getting more physical activity can help. Changing your diet will help you lose weight more easily than adding exercise.   Quitting smoking  Smoking and other tobacco use can raise cholesterol and make it harder to control. Quitting is tough. But millions of people have given up tobacco for good. You can quit, too! Think about some of the reasons below to quit smoking. Do any of them make you think twice about your smoking habit?   Stop smoking because it:    Keeps your cholesterol high, even if you make all the other changes you re supposed to    Damages your body. It especially harms your heart, lungs, skin, and blood vessels.    Makes you more likely to have a heart attack (acute myocardial infarction), stroke, or cancer    Stains your teeth    Makes your skin, clothes, and breath smell bad    Costs a lot of money  Ask your healthcare provider for medicines or nicotine replacement products to help you quit.   Controlling stress   Learn ways to control stress. This will help you deal with stress in your home and work life. Controlling stress can greatly lower your risk of getting cardiovascular disease.   Making the most of medicines  Healthy eating and exercise are a good start to keeping your cholesterol down. But you may need some extra help from medicine. If your doctor prescribes medicine, be sure to take it exactly as  directed. Remember:     Tell your healthcare provider about all other medicines you take. This includes vitamins and herbs.    Tell your healthcare provider if you have any side effects after starting to take a medicine. Examples of side effects to watch for include muscle aches, weakness, blurred vision, rust-colored urine, yellowing of eyes or skin (jaundice), abdominal pain, and headache.    Don t skip a dose or stop taking your medicine because you feel better or because your cholesterol numbers go down. Never stop taking your medicine unless your healthcare provider has told you it s OK.    Ask your healthcare provider if you have any questions about your medicines.  Risk groups  Some people may need to take medicines called statins to control their cholesterol. This is in addition to eating a healthy diet and getting regular exercise. The dose or intensity of the statin depends on your risk factors.   Statins can help you stay healthy. They can also help prevent a heart attack or stroke. You may need to take a statin if you are in one of the intermediate, high, or very-high risk groups or if you have a multiple risk (enhancers) conditions.   Calculating a risk score is done looking at certain risk factors that are considered as an increased risk for atherosclerosis. The risk calculator tool looks at:     Ages between 40-79 years of age    Gender    Race    Blood pressure (systolic and diastolic measurements)    Total cholesterol, good cholesterol (HDL), and bad cholesterol (LDL) levels    History of diabetes    Current or past smoking history    Treatment for high blood pressure    Treatment of cholesterol with a statin    Current use of aspirin  If you are uncertain about your risk factors, you and your provider may decide to proceed with a scan to determine a coronary artery calcium (CAC) score. Depending on the results of this scan you and your provider may decide on whether starting a medicine for  cholesterol is the best treatment option for you.   Talk with your healthcare provider about your treatment goals. Make sure you understand why these goals are important, based on your own health history and your family history of heart disease or high cholesterol.   Make a plan to have regular cholesterol checks. You may need to fast before getting this test. Also ask your provider about any side effects your medicines may cause. Let your provider know about any side effects you have. You may need to take more than one medicine to reach the cholesterol goals that you and your provider decide on.   [employer] last reviewed this educational content on 7/1/2019 2000-2021 [employer]. All rights reserved. This information is not intended as a substitute for professional medical care. Always follow your healthcare professional's instructions.

## 2021-06-21 NOTE — PROGRESS NOTES
Assessment & Plan     Low serum HDL  Lipids in May 2021, non-fasting sample with elevated triglycerides (280) and low HDL (42), but normal range LDL (88). 10 year ASCVD risk of 1.0%.   - Discussed results in detail today.   - Reviewed healthy lifestyle changes to promote improvement in lipid panel.   - Advised repeating lipids with a fasting sample in follow-up in the next 2-3 months.    Hypovitaminosis D  Started on 800 international unit(s) cholecalciferol daily in May 2021 for a value of 22.7 ng/mL  - Continue supplement. Will reassess with next blood draw.    Dyspnea on exertion  About one year of sudden onset dyspnea with more intense exertion accompanied by chest tightness and heavy breathing that improves quickly with rest. Able to tolerate exercise at a more moderate pace. No smoking history or previous asthma diagnoses. Recent evaluation for anemia and thyroid dysfunction was unremarkable. No previous cardiac disease history, other signs/sx of CHF or valvular disease. Low ASCVD risk factor with elevated TGs and low HDL her only risk factors. Differential includes exercise-induced asthma and deconditioning. Angina far less likely. Anxiety may also be contributing.  - Reviewed potential causes of symptoms briefly.   - Advised close monitoring for triggers and slow expansion of exercise intensity and frequency.  - Close follow-up if worsening frequency or intensity. Would then obtain spirometry and EKG with low threshold for stress testing.    Healthcare maintenance  Due for Tdap and Zoster. Prefers to wait on vaccines given recent arm soreness from COVID-19 vaccine.   - Advised Tdap in follow-up and Zoster at her pharmacy.      42 minutes spent on the date of the encounter doing chart review, patient visit and documentation        Return in about 2 months (around 8/21/2021) for Follow up in 2 months to recheck cholesterol and check back on your breathing symptoms.  Or sooner as symptoms indicate.  "    Elicia Kraus MD  [facility]    Elias Lezama is a 50 year old who presents for the following health issues    Due to language barrier, an  was present during the history-taking and subsequent discussion (and for part of the physical exam) with this patient.    HPI     1. Follow-up lab results: Here to discuss her lab results from last time. She is concerned with her cholesterol lab values because she is worried that it might be a problem with her heart from the fats in her blood. Is taking her vitamin D and feels perhaps a bit less tired.     2. Dyspnea on exertion: When she is rushing and pushes herself with activity, she develops some shortness of breath and chest tightness. For example, when she is going up one flight of stairs quickly or rushing around her house to prepare some food for her son. Symptoms first started in 2020. She is not sure of the frequency of her symptoms; they have improved since she stopped pushing herself.  She is able to exercise walking 40 minutes at a time, 2-3 times per week. She is also active with gardening without any symptoms.  Episodes last about 5 minutes and involve more heavy breathing, improving with rest. No coughing or wheezing. No nausea but her stomach feels unsettled sometimes when she is short of breath. No diaphoresis. No lower leg swelling, orthopnea or PND.    3. Due for Tdap: She has chronic left shoulder pain from a previous injury. She got her 2nd COVID-19 vaccine in this arm last month and her arm is still quite sore in the area. It is feeling better but she prefers to delay her tetanus vaccine for now.           Objective    BP 97/66 (BP Location: Right arm, Patient Position: Sitting, Cuff Size: Adult Regular)   Pulse 65   Temp 98  F (36.7  C) (Oral)   Resp 18   Ht 1.43 m (4' 8.3\")   Wt 49.9 kg (110 lb)   SpO2 98%   BMI 24.40 kg/m    Body mass index is 24.4 kg/m .  Physical Exam   GENERAL: healthy, alert " and no distress  RESP: lungs clear to auscultation - no rales, rhonchi or wheezes  CV: regular rate and rhythm, normal S1 S2, no S3 or S4, no murmur, click or rub, no peripheral edema and peripheral pulses strong  MS: no gross musculoskeletal defects noted, no edema

## 2021-07-25 ENCOUNTER — HEALTH MAINTENANCE LETTER (OUTPATIENT)
Age: 51
End: 2021-07-25

## 2021-08-10 ENCOUNTER — TELEPHONE (OUTPATIENT)
Dept: FAMILY MEDICINE | Facility: CLINIC | Age: 51
End: 2021-08-10

## 2021-08-11 NOTE — TELEPHONE ENCOUNTER
Patient called back and spoke to Rupa about the referrals, states that she is going to wait. Does have an upcoming appt with PCP.

## 2021-09-13 ENCOUNTER — OFFICE VISIT (OUTPATIENT)
Dept: FAMILY MEDICINE | Facility: CLINIC | Age: 51
End: 2021-09-13
Payer: COMMERCIAL

## 2021-09-13 VITALS
OXYGEN SATURATION: 99 % | DIASTOLIC BLOOD PRESSURE: 65 MMHG | TEMPERATURE: 98 F | HEIGHT: 56 IN | BODY MASS INDEX: 24.3 KG/M2 | WEIGHT: 108 LBS | RESPIRATION RATE: 16 BRPM | SYSTOLIC BLOOD PRESSURE: 100 MMHG | HEART RATE: 60 BPM

## 2021-09-13 DIAGNOSIS — R06.02 SHORTNESS OF BREATH: Primary | ICD-10-CM

## 2021-09-13 DIAGNOSIS — Z12.12 ENCOUNTER FOR COLORECTAL CANCER SCREENING: ICD-10-CM

## 2021-09-13 DIAGNOSIS — E55.9 HYPOVITAMINOSIS D: ICD-10-CM

## 2021-09-13 DIAGNOSIS — R74.8 LOW SERUM HDL: ICD-10-CM

## 2021-09-13 DIAGNOSIS — Z12.11 ENCOUNTER FOR COLORECTAL CANCER SCREENING: ICD-10-CM

## 2021-09-13 DIAGNOSIS — M54.50 ACUTE RIGHT-SIDED LOW BACK PAIN WITHOUT SCIATICA: ICD-10-CM

## 2021-09-13 LAB
CHOLEST SERPL-MCNC: 226 MG/DL
FASTING STATUS PATIENT QL REPORTED: NO
HDLC SERPL-MCNC: 56 MG/DL
LDLC SERPL CALC-MCNC: 121 MG/DL
TRIGL SERPL-MCNC: 246 MG/DL

## 2021-09-13 PROCEDURE — 36415 COLL VENOUS BLD VENIPUNCTURE: CPT | Performed by: FAMILY MEDICINE

## 2021-09-13 PROCEDURE — 82306 VITAMIN D 25 HYDROXY: CPT | Performed by: FAMILY MEDICINE

## 2021-09-13 PROCEDURE — 99214 OFFICE O/P EST MOD 30 MIN: CPT | Performed by: FAMILY MEDICINE

## 2021-09-13 PROCEDURE — 80061 LIPID PANEL: CPT | Performed by: FAMILY MEDICINE

## 2021-09-13 ASSESSMENT — MIFFLIN-ST. JEOR: SCORE: 975.75

## 2021-09-13 NOTE — NURSING NOTE
Due to patient being non-English speaking/uses sign language, an  was used for this visit. Only for face-to-face interpretation by an external agency, date and length of interpretation can be found on the scanned worksheet.     name: Angeline Hannah   Agency: [employer]  Language: Germaine   Telephone number: [phone]  Type of interpretation: Face-to-face, spoken

## 2021-09-13 NOTE — PROGRESS NOTES
Assessment & Plan     Shortness of breath  Significant symptomatic improvement in the last few months and often associated with stress/anxiety per patient. Recent evaluation for anemia and thyroid dysfunction was unremarkable. No previous cardiac disease history, other signs/sx of CHF or valvular disease. Low ASCVD risk factor with elevated TGs and low HDL her only risk factors.  - Encouraged ongoing work with therapy for support.   - Reviewed warning signs that should prompt urgent follow-up, including signs/sx of angina.    Hypovitaminosis D  Started on 800 international unit(s) cholecalciferol daily in May 2021 for a value of 22.7 ng/mL. Due for reassessment today.   - Vitamin D deficiency screening; Future  - Vitamin D deficiency screening    Low serum HDL  Lipids in May 2021, non-fasting sample with elevated triglycerides (280) and low HDL (42), but normal range LDL (88). 10 year ASCVD risk of 1.0%. Here today to repeat lipids with a fasting sample.   - Lipid panel; Future  - Lipid panel  - Discussed lifestyle changes to improve cholesterol profile in depth.    Acute right-sided low back pain without sciatica  Acute onset of low back pain after stooping forward when picking beans in the field. Suspect mechanical etiology, likely a lumbar strain. No red flag symptoms.  - Conservative management with heat, NSAIDs, gentle stretching and activity as tolerated.   - Close follow-up if not improving or worsening.    Encounter for colorectal cancer screening  Previously had agreed to a colonoscopy but now prefers FIT testing initially. No previous blood in her stools or FH of colorectal cancer.  - Fecal colorectal cancer screen FIT; Future    34 minutes spent on the date of the encounter doing chart review, patient visit and documentation        Encouraged consideration of Tdap and influenza vaccines along with completing mammography. Patient to consider. Follow-up as needed.    Elicia Kraus MD  [facility] Griffin Mai is a 50 year old who presents for the following health issues     Due to language barrier, an  was present during the history-taking and subsequent discussion (and for part of the physical exam) with this patient.      HPI     Here today to check back on her labs. She was advised to return to repeat her cholesterol testing with a fasting sample due to elevated triglycerides. She has been walking regularly 30 minutes either at the gym or outside. She is getting this done 4-5 times per week. She tends to eat one large meal each day but is trying to eat more frequently.    She reports that her breathing has improved significantly. She notices it happening more when she is feeling stressed, like when she is running late or something else is off. She attributes it to being in a hurry or anxious. She spoke with a therapist and is doing some breathing exercises when she's experiencing the symptoms with significant benefit. She hasn't noticed the shortness of breath with exertion and denies any chest pain, lower extremity edema, PND or orthopnea.     She is also experiencing some intermittent lower back pain that has disrupted her physical activity for the past few days. It happened after bending over to pick beans with her sister at her farm last week. Heat has been helping. She hasn't tried any medications. No radicular component or changes to bladder or bowel function.         Objective    /65   Pulse 60   Temp 98  F (36.7  C) (Oral)   Resp 16   Ht 1.435 m (4' 8.5\")   Wt 49 kg (108 lb)   SpO2 99%   BMI 23.79 kg/m    Body mass index is 23.79 kg/m .  Physical Exam   GENERAL: healthy, alert and no distress  RESP: normal rate and effort  MS: no gross musculoskeletal defects noted, no edema, full active ROM of the back  NEURO: Moving all extremities equally. Normal gross coordination and gait.          "

## 2021-09-13 NOTE — PATIENT INSTRUCTIONS
- We will call you with the results of your blood tests.     - Get your tetanus shot done as soon as possible. Also consider getting your flu shot this year.    - Keep working on eating healthy, plenty of fresh vegetables and fruits and whole grains like oatmeal and brown rice. Also work on incorporating lean proteins like beans, tofu, fish or seafood, and chicken or turkey.     - Keep exercising 30 minutes of moderate activity 5 days per week.    - Send back the stool test as a screen for colorectal cancer.     - Set up your mammogram as soon as possible.

## 2021-09-14 PROBLEM — E78.1 HYPERTRIGLYCERIDEMIA: Status: ACTIVE | Noted: 2021-05-11

## 2021-09-14 LAB — DEPRECATED CALCIDIOL+CALCIFEROL SERPL-MC: 54 UG/L (ref 30–80)

## 2021-09-19 ENCOUNTER — HEALTH MAINTENANCE LETTER (OUTPATIENT)
Age: 51
End: 2021-09-19

## 2021-09-20 ENCOUNTER — LAB (OUTPATIENT)
Dept: LAB | Facility: CLINIC | Age: 51
End: 2021-09-20
Payer: COMMERCIAL

## 2021-09-20 DIAGNOSIS — Z12.11 ENCOUNTER FOR COLORECTAL CANCER SCREENING: ICD-10-CM

## 2021-09-20 DIAGNOSIS — Z12.12 ENCOUNTER FOR COLORECTAL CANCER SCREENING: ICD-10-CM

## 2021-09-20 LAB — HEMOCCULT STL QL IA: NEGATIVE

## 2021-09-20 PROCEDURE — 82274 ASSAY TEST FOR BLOOD FECAL: CPT

## 2021-09-22 ENCOUNTER — TELEPHONE (OUTPATIENT)
Dept: FAMILY MEDICINE | Facility: CLINIC | Age: 51
End: 2021-09-22

## 2021-09-22 NOTE — TELEPHONE ENCOUNTER
Patient was returning call back to find out what her colorectal FIT result was. Informed result negative. Recommend repeat in one year per Dr Kraus. Triage unable to document in result note, will route to Dominga Pratt CMA to inform her of the completed action. Bre IVAN

## 2021-10-18 ENCOUNTER — OFFICE VISIT (OUTPATIENT)
Dept: FAMILY MEDICINE | Facility: CLINIC | Age: 51
End: 2021-10-18
Payer: COMMERCIAL

## 2021-10-18 VITALS
RESPIRATION RATE: 20 BRPM | BODY MASS INDEX: 23.08 KG/M2 | DIASTOLIC BLOOD PRESSURE: 65 MMHG | TEMPERATURE: 98 F | SYSTOLIC BLOOD PRESSURE: 100 MMHG | HEART RATE: 65 BPM | WEIGHT: 107 LBS | OXYGEN SATURATION: 99 % | HEIGHT: 57 IN

## 2021-10-18 DIAGNOSIS — R10.12 LEFT UPPER QUADRANT PAIN: Primary | ICD-10-CM

## 2021-10-18 DIAGNOSIS — R10.13 EPIGASTRIC PAIN: ICD-10-CM

## 2021-10-18 LAB
ALBUMIN SERPL-MCNC: 4 G/DL (ref 3.5–5)
ALP SERPL-CCNC: 74 U/L (ref 45–120)
ALT SERPL W P-5'-P-CCNC: 18 U/L (ref 0–45)
ANION GAP SERPL CALCULATED.3IONS-SCNC: 10 MMOL/L (ref 5–18)
AST SERPL W P-5'-P-CCNC: 18 U/L (ref 0–40)
BILIRUB SERPL-MCNC: 0.6 MG/DL (ref 0–1)
BUN SERPL-MCNC: 12 MG/DL (ref 8–22)
CALCIUM SERPL-MCNC: 9.7 MG/DL (ref 8.5–10.5)
CHLORIDE BLD-SCNC: 104 MMOL/L (ref 98–107)
CO2 SERPL-SCNC: 25 MMOL/L (ref 22–31)
CREAT SERPL-MCNC: 0.73 MG/DL (ref 0.6–1.1)
GFR SERPL CREATININE-BSD FRML MDRD: >90 ML/MIN/1.73M2
GLUCOSE BLD-MCNC: 93 MG/DL (ref 70–125)
LIPASE SERPL-CCNC: 17 U/L (ref 0–52)
POTASSIUM BLD-SCNC: 3.9 MMOL/L (ref 3.5–5)
PROT SERPL-MCNC: 7.3 G/DL (ref 6–8)
SODIUM SERPL-SCNC: 139 MMOL/L (ref 136–145)

## 2021-10-18 PROCEDURE — 83690 ASSAY OF LIPASE: CPT | Performed by: FAMILY MEDICINE

## 2021-10-18 PROCEDURE — 99214 OFFICE O/P EST MOD 30 MIN: CPT | Mod: 25 | Performed by: FAMILY MEDICINE

## 2021-10-18 PROCEDURE — 80053 COMPREHEN METABOLIC PANEL: CPT | Performed by: FAMILY MEDICINE

## 2021-10-18 PROCEDURE — 36415 COLL VENOUS BLD VENIPUNCTURE: CPT | Performed by: FAMILY MEDICINE

## 2021-10-18 ASSESSMENT — MIFFLIN-ST. JEOR: SCORE: 971.29

## 2021-10-18 NOTE — NURSING NOTE
Due to patient being non-English speaking/uses sign language, an  was used for this visit. Only for face-to-face interpretation by an external agency, date and length of interpretation can be found on the scanned worksheet.     name: Angeline Hannah  Agency: [employer]  Language: Germaine   Telephone number: [phone]   Type of interpretation: Telephone, spoken

## 2021-10-18 NOTE — PROGRESS NOTES
Assessment & Plan     Left upper quadrant pain  Epigastric pain  Episodically worsening epigastric and LUQ abdominal pain that is triggered by acidic foods. Symptoms most suggestive of PUD vs. Gastritis. Pancreatitis is also on the differential. No exertional component to suggest cardiac etiology. Musculoskeletal exam is benign in this distribution. Neg. H.pylori testing earlier this year.  - Trial of omeprazole (PRILOSEC) 20 MG DR capsule; Take 1 capsule (20 mg) by mouth daily  - Reviewed potential triggers to avoid.   - Comprehensive metabolic panel; Future  - Comprehensive metabolic panel  - Lipase; Future    36 minutes spent on the date of the encounter doing chart review, review of outside records, patient visit and documentation        Close follow up in the next 4 weeks or sooner if worsening symptoms.     Elicia Kraus MD  [facility]    Elias Lezama is a 50 year old who presents for the following health issues     Due to language barrier, an  was present during the history-taking and subsequent discussion (and for part of the physical exam) with this patient.    HPI   Lise is here with left side/back pain. She's had this pain intermittently in the past, but it is worse for the past month or so. It is constantly present and just waxes and wanes in severity. Worse when she doesn't sleep well but she has also noticed that it certain foods are triggers like acidic foods. However, even when avoiding these she continues to have pain. No radiation but she does note some involvement of her left upper quadrant abdominal area. She describes it as a hot or burning sensation. Better when she eats plain food and drinks a lot of water. She has tried Tums in the past without benefit.     She is worried that it is her kidneys causing this discomfort. FH of kidney disease in her mother in old age that caused difficulty voiding and required catheterization. She's not sure of  "the cause of this. She denies any dysuria, urinary frequency or urgency, hematuria, or lower abdominal pain. No fevers/chills, body aches, nausea/vomiting or changes to her stool including melena or hematochezia. No associated rashes. No dysphagia or unexpected weight loss. No recent NSAID use or steroid use. No regular alcohol use.    No known history of H.pylori and she was tested in April 2021 for this at [facility] with a negative result. Liver testing was also normal at that time. No renal panel assessments in the past year.         Objective    /65   Pulse 65   Temp 98  F (36.7  C)   Resp 20   Ht 1.435 m (4' 8.5\")   Wt 48.5 kg (107 lb)   SpO2 99%   BMI 23.57 kg/m    Body mass index is 23.57 kg/m .  Physical Exam   GENERAL: healthy, alert and no distress  RESP: lungs clear to auscultation - no rales, rhonchi or wheezes, normal rate and effort  CV: regular rate and rhythm, normal S1 S2, no S3 or S4, no murmur, click or rub, peripheral pulses strong  ABDOMEN: soft, mild tenderness to palpation over epigastrium and left upper quadrant, no hepatosplenomegaly, no masses and bowel sounds normal  MS: no gross musculoskeletal defects noted, no edema  SKIN: no suspicious lesions or rashes  BACK: no CVA tenderness bilaterally or tenderness to palpation over paraspinal musculature        "

## 2021-10-18 NOTE — LETTER
October 19, 2021      Lise Ross  [address] [place] [address]        Dear ,    We are writing to inform you of your test results.    Your test results fall within the expected range(s) or remain unchanged from previous results.  Please continue with current treatment plan.    Resulted Orders   Comprehensive metabolic panel   Result Value Ref Range    Sodium 139 136 - 145 mmol/L    Potassium 3.9 3.5 - 5.0 mmol/L    Chloride 104 98 - 107 mmol/L    Carbon Dioxide (CO2) 25 22 - 31 mmol/L    Anion Gap 10 5 - 18 mmol/L    Urea Nitrogen 12 8 - 22 mg/dL    Creatinine 0.73 0.60 - 1.10 mg/dL    Calcium 9.7 8.5 - 10.5 mg/dL    Glucose 93 70 - 125 mg/dL    Alkaline Phosphatase 74 45 - 120 U/L    AST 18 0 - 40 U/L    ALT 18 0 - 45 U/L    Protein Total 7.3 6.0 - 8.0 g/dL    Albumin 4.0 3.5 - 5.0 g/dL    Bilirubin Total 0.6 0.0 - 1.0 mg/dL    GFR Estimate >90 >60 mL/min/1.73m2      Comment:      As of July 11, 2021, eGFR is calculated by the CKD-EPI creatinine equation, without race adjustment. eGFR can be influenced by muscle mass, exercise, and diet. The reported eGFR is an estimation only and is only applicable if the renal function is stable.   Lipase   Result Value Ref Range    Lipase 17 0 - 52 U/L       If you have any questions or concerns, please call the clinic at the number listed above.       Sincerely,      Elicia Kraus MD

## 2021-10-18 NOTE — PATIENT INSTRUCTIONS
- We will call you with the results of the blood tests later this week.   - Start taking the omeprazole medicine once daily in the morning before eating.     - Follow-up in 4 weeks to check back on your symptoms, or sooner if worsening.

## 2022-03-14 ENCOUNTER — OFFICE VISIT (OUTPATIENT)
Dept: FAMILY MEDICINE | Facility: CLINIC | Age: 52
End: 2022-03-14
Payer: COMMERCIAL

## 2022-03-14 VITALS
BODY MASS INDEX: 25 KG/M2 | SYSTOLIC BLOOD PRESSURE: 116 MMHG | OXYGEN SATURATION: 96 % | HEIGHT: 56 IN | RESPIRATION RATE: 12 BRPM | WEIGHT: 111.12 LBS | DIASTOLIC BLOOD PRESSURE: 72 MMHG | HEART RATE: 76 BPM | TEMPERATURE: 98 F

## 2022-03-14 DIAGNOSIS — R10.84 DIFFUSE ABDOMINAL PAIN: ICD-10-CM

## 2022-03-14 DIAGNOSIS — R30.0 DYSURIA: ICD-10-CM

## 2022-03-14 DIAGNOSIS — R10.2 PELVIC PAIN IN FEMALE: ICD-10-CM

## 2022-03-14 DIAGNOSIS — R10.13 EPIGASTRIC PAIN: Primary | ICD-10-CM

## 2022-03-14 LAB
ALBUMIN UR-MCNC: NEGATIVE MG/DL
APPEARANCE UR: CLEAR
BACTERIA #/AREA URNS HPF: ABNORMAL /HPF
BILIRUB UR QL STRIP: NEGATIVE
COLOR UR AUTO: YELLOW
GLUCOSE UR STRIP-MCNC: NEGATIVE MG/DL
HGB UR QL STRIP: NEGATIVE
KETONES UR STRIP-MCNC: NEGATIVE MG/DL
LEUKOCYTE ESTERASE UR QL STRIP: ABNORMAL
NITRATE UR QL: NEGATIVE
PH UR STRIP: 6 [PH] (ref 5–7)
RBC #/AREA URNS AUTO: ABNORMAL /HPF
SP GR UR STRIP: <=1.005 (ref 1–1.03)
SQUAMOUS #/AREA URNS AUTO: ABNORMAL /LPF
UROBILINOGEN UR STRIP-ACNC: 0.2 E.U./DL
WBC #/AREA URNS AUTO: ABNORMAL /HPF

## 2022-03-14 PROCEDURE — 81001 URINALYSIS AUTO W/SCOPE: CPT | Performed by: STUDENT IN AN ORGANIZED HEALTH CARE EDUCATION/TRAINING PROGRAM

## 2022-03-14 PROCEDURE — 99214 OFFICE O/P EST MOD 30 MIN: CPT | Mod: GC | Performed by: STUDENT IN AN ORGANIZED HEALTH CARE EDUCATION/TRAINING PROGRAM

## 2022-03-14 RX ORDER — OMEPRAZOLE 40 MG/1
40 CAPSULE, DELAYED RELEASE ORAL DAILY
Qty: 30 CAPSULE | Refills: 3 | Status: SHIPPED | OUTPATIENT
Start: 2022-03-14

## 2022-03-14 NOTE — PROGRESS NOTES
Assessment and Plan     Epigastric Pain:  Lower Abdominal Pain:  Epigastric that radiates to back at times, worse with acidy/spicy foods. Also has diffuse lower abdominal/ upper pelvic discomfort. Some dysuria. Abd not TTP. No diarrhea. Negative H Pylori last year. Similar to previous epigastric pain, and responded well to PPI. Given new lower abdominal pain, obtained UA, which was negative. So unlikely UTI. Will obtain pelvic US.   - UA, negative   - Pelvic US  - Refilled Omeprazole  - Follow-up 1 month      Options for treatment and follow-up care were reviewed with the patient and/or guardian. Lise Ross and/or guardian engaged in the decision making process and verbalized understanding of the options discussed and agreed with the final plan.    Rolo Cowart DO, MBA  [facility], PGY-3    Precepted patient with Dr. Elicia Kraus       HPI:   Lise Ross is a 51 year old female who presents to clinic today for   Chief Complaint   Patient presents with     Abdominal Pain     Ongoing stomach pain and burning radial to her back and lower abdominal causing patient to unable to sleep     Shoulder Pain     Left shoulder pain radial to neck and head     Recheck Medication     Like to see if there is other available medicine for stomach pain     Dizziness     Blood Draw     Requesting multiple lab test done       ABd Pain:  - Long time, but worse over past week   - Burning, epigastric, travels to back. Lower abd as well, diffusely  - difficulty sleeping due to pain  - Worsens with PO sometimes, spicy  - primary diet of oatmeal recently as only thing doesn't irritate stomach  - omeprazole helped previously 5 months ago prescribed. Ran out 2 months ago  - dysuria past day       Denies Fever, Chest Pain, shortness of breath, changes in vision/hearing/GI/diet, numbness/tingling, or any other concerns.         PMHX:   Active Problems List  Patient  "Active Problem List   Diagnosis     Recurrent major depressive disorder (H)     Screening for malignant neoplasm of cervix     Anxiety     Fatigue, unspecified type     Family history of diabetes mellitus     Hypertriglyceridemia     Hypovitaminosis D       Current Medications  Current Outpatient Medications   Medication Sig Dispense Refill     Acetaminophen (TYLENOL PO)        omeprazole (PRILOSEC) 20 MG DR capsule Take 1 capsule (20 mg) by mouth daily 30 capsule 1     vitamin D3 (CHOLECALCIFEROL) 10 MCG (400 UNIT) capsule Take 2 capsules (800 Units) by mouth daily 60 capsule 11       Social History  Social History     Tobacco Use     Smoking status: Never Smoker     Smokeless tobacco: Never Used   Substance Use Topics     Alcohol use: Never     Drug use: Never     History   Drug Use Unknown       Family History  Family History   Problem Relation Age of Onset     Diabetes Mother      Cancer Mother      Hypertension No family hx of      Albinism No family hx of      Heart Disease No family hx of      Coronary Artery Disease No family hx of      Asthma No family hx of        Allergies  Allergies   Allergen Reactions     Codeine Other (See Comments)     Trouble breathing, drowsy  Shaky and tired              Physical Exam:     Vitals:    03/14/22 1136   BP: 116/72   BP Location: Left arm   Patient Position: Sitting   Cuff Size: Adult Regular   Pulse: 76   Resp: 12   Temp: 98  F (36.7  C)   TempSrc: Oral   SpO2: 96%   Weight: 50.4 kg (111 lb 1.9 oz)   Height: 1.43 m (4' 8.3\")     Body mass index is 24.65 kg/m .    GENERAL APPEARANCE: alert, appears stated age, no acute distress  HEENT: Eyes grossly normal to inspection, nares normal, and mouth and throat without erythema, ulcers, or lesions  RESP: lungs clear to auscultation - no rales, rhonchi, or wheezes  CV: regular rate and rhythm, no murmur, click, rub, or gallop  ABDOMEN: soft, nontender to palpation  MSK: extremities normal, no gross deformities noted, no lower " extremity edema  SKIN: no suspicious lesions or rashes   PSYCH: mood and affect normal/bright

## 2022-03-23 NOTE — PROGRESS NOTES
Preceptor Attestation:  Patient's case reviewed and discussed with the resident, Nelson Cowart MD, and I personally evaluated the patient. I agree with written assessment and plan of care.    Supervising Physician:  Elicia Kraus MD   [facility]

## 2022-04-26 ENCOUNTER — TELEPHONE (OUTPATIENT)
Dept: FAMILY MEDICINE | Facility: CLINIC | Age: 52
End: 2022-04-26
Payer: COMMERCIAL

## 2022-04-26 NOTE — TELEPHONE ENCOUNTER
On review of our patient panels,  this patient is due for a clinic appointment. Please help arrange to schedule an appointment for -Cervical Cancer Screening  -Wellness (Physical) Visit  in 2-3 Months.    Elicia Kraus MD

## 2022-04-27 ENCOUNTER — APPOINTMENT (OUTPATIENT)
Dept: INTERPRETER SERVICES | Facility: CLINIC | Age: 52
End: 2022-04-27
Payer: COMMERCIAL

## 2022-06-26 ENCOUNTER — HEALTH MAINTENANCE LETTER (OUTPATIENT)
Age: 52
End: 2022-06-26

## 2022-07-22 NOTE — RESULT ENCOUNTER NOTE
SPINE PATIENTS - NEW PROTOCOL PREVISIT    RECORDS RECEIVED FROM: Internal   REASON FOR VISIT: Cervical and LUMBAR   Date of Appt: 09/02/2022   NOTES (FOR ALL VISITS) STATUS DETAILS   OFFICE NOTE from referring provider Internal 07/21/2022 Dr Storey [facility]    OFFICE NOTE from other specialist Internal 07/07/2022 Dr Bailey [facility]    DISCHARGE SUMMARY from hospital N/A    DISCHARGE REPORT from ER N/A    EMG REPORT N/A    MEDICATION LIST N/A    IMAGING  (FOR ALL VISITS)     MRI (HEAD, NECK, SPINE) Internal 08/08/2022 cervical spine  07/06/2022 lumbar spine   XRAY (SPINE) *NEUROSURGERY* N/A    CT (HEAD, NECK, SPINE) N/A        Discussed results with patient during clinic visit.

## 2022-08-21 ENCOUNTER — HEALTH MAINTENANCE LETTER (OUTPATIENT)
Age: 52
End: 2022-08-21

## 2022-11-08 ENCOUNTER — TELEPHONE (OUTPATIENT)
Dept: FAMILY MEDICINE | Facility: CLINIC | Age: 52
End: 2022-11-08

## 2022-11-08 NOTE — TELEPHONE ENCOUNTER
"Call and left message for patient to give us a call back. If patient call back, Please help schedule patient for these appt.       \"On review of our patient panels,  this patient is due for a clinic appointment. Please help arrange to schedule an appointment for -Breast Cancer Screening  -Colon Cancer Screening  -Cervical Cancer Screening  -Wellness (Physical) Visit  in 4 Weeks.\"  "

## 2022-11-08 NOTE — TELEPHONE ENCOUNTER
On review of our patient panels,  this patient is due for a clinic appointment. Please help arrange to schedule an appointment for -Breast Cancer Screening  -Colon Cancer Screening  -Cervical Cancer Screening  -Wellness (Physical) Visit  in 4 Weeks.    Thanks!    Elicia Kraus MD

## 2022-11-21 ENCOUNTER — HEALTH MAINTENANCE LETTER (OUTPATIENT)
Age: 52
End: 2022-11-21

## 2022-11-25 ENCOUNTER — TELEPHONE (OUTPATIENT)
Dept: FAMILY MEDICINE | Facility: CLINIC | Age: 52
End: 2022-11-25

## 2022-11-25 NOTE — TELEPHONE ENCOUNTER
On review of our patient panels,  this patient is due for a clinic appointment. Please help arrange to schedule an appointment for -Breast Cancer Screening  -Colon Cancer Screening  -Cervical Cancer Screening  -Wellness (Physical) Visit  in the next 4 Weeks.    Thanks!    Elicia Kraus MD

## 2023-01-29 ENCOUNTER — HOSPITAL ENCOUNTER (EMERGENCY)
Facility: HOSPITAL | Age: 53
Discharge: HOME | End: 2023-01-29
Attending: FAMILY MEDICINE | Admitting: FAMILY MEDICINE
Payer: COMMERCIAL

## 2023-01-29 ENCOUNTER — APPOINTMENT (OUTPATIENT)
Dept: CT IMAGING | Facility: HOSPITAL | Age: 53
End: 2023-01-29
Attending: FAMILY MEDICINE
Payer: COMMERCIAL

## 2023-01-29 VITALS
WEIGHT: 112.6 LBS | SYSTOLIC BLOOD PRESSURE: 120 MMHG | OXYGEN SATURATION: 99 % | BODY MASS INDEX: 25.33 KG/M2 | RESPIRATION RATE: 18 BRPM | DIASTOLIC BLOOD PRESSURE: 76 MMHG | HEIGHT: 56 IN | HEART RATE: 70 BPM | TEMPERATURE: 98.3 F

## 2023-01-29 DIAGNOSIS — K59.00 CONSTIPATION, UNSPECIFIED CONSTIPATION TYPE: ICD-10-CM

## 2023-01-29 DIAGNOSIS — R30.0 DYSURIA: ICD-10-CM

## 2023-01-29 LAB
ALBUMIN UR-MCNC: NEGATIVE MG/DL
ANION GAP SERPL CALCULATED.3IONS-SCNC: 10 MMOL/L (ref 7–15)
APPEARANCE UR: CLEAR
BASOPHILS # BLD AUTO: 0 10E3/UL (ref 0–0.2)
BASOPHILS NFR BLD AUTO: 1 %
BILIRUB UR QL STRIP: NEGATIVE
BUN SERPL-MCNC: 5.9 MG/DL (ref 6–20)
CALCIUM SERPL-MCNC: 9.3 MG/DL (ref 8.6–10)
CHLORIDE SERPL-SCNC: 101 MMOL/L (ref 98–107)
COLOR UR AUTO: COLORLESS
CREAT SERPL-MCNC: 0.59 MG/DL (ref 0.51–0.95)
DEPRECATED HCO3 PLAS-SCNC: 26 MMOL/L (ref 22–29)
EOSINOPHIL # BLD AUTO: 0.1 10E3/UL (ref 0–0.7)
EOSINOPHIL NFR BLD AUTO: 1 %
ERYTHROCYTE [DISTWIDTH] IN BLOOD BY AUTOMATED COUNT: 11.8 % (ref 10–15)
GFR SERPL CREATININE-BSD FRML MDRD: >90 ML/MIN/1.73M2
GLUCOSE SERPL-MCNC: 104 MG/DL (ref 70–99)
GLUCOSE UR STRIP-MCNC: NEGATIVE MG/DL
HCT VFR BLD AUTO: 38.5 % (ref 35–47)
HGB BLD-MCNC: 13.2 G/DL (ref 11.7–15.7)
HGB UR QL STRIP: NEGATIVE
IMM GRANULOCYTES # BLD: 0 10E3/UL
IMM GRANULOCYTES NFR BLD: 0 %
KETONES UR STRIP-MCNC: NEGATIVE MG/DL
LEUKOCYTE ESTERASE UR QL STRIP: NEGATIVE
LYMPHOCYTES # BLD AUTO: 1.7 10E3/UL (ref 0.8–5.3)
LYMPHOCYTES NFR BLD AUTO: 33 %
MCH RBC QN AUTO: 30.8 PG (ref 26.5–33)
MCHC RBC AUTO-ENTMCNC: 34.3 G/DL (ref 31.5–36.5)
MCV RBC AUTO: 90 FL (ref 78–100)
MONOCYTES # BLD AUTO: 0.4 10E3/UL (ref 0–1.3)
MONOCYTES NFR BLD AUTO: 7 %
NEUTROPHILS # BLD AUTO: 3.1 10E3/UL (ref 1.6–8.3)
NEUTROPHILS NFR BLD AUTO: 58 %
NITRATE UR QL: NEGATIVE
NRBC # BLD AUTO: 0 10E3/UL
NRBC BLD AUTO-RTO: 0 /100
PH UR STRIP: 5.5 [PH] (ref 5–7)
PLATELET # BLD AUTO: 201 10E3/UL (ref 150–450)
POTASSIUM SERPL-SCNC: 3.5 MMOL/L (ref 3.4–5.3)
RBC # BLD AUTO: 4.28 10E6/UL (ref 3.8–5.2)
RBC URINE: <1 /HPF
SODIUM SERPL-SCNC: 137 MMOL/L (ref 136–145)
SP GR UR STRIP: 1 (ref 1–1.03)
UROBILINOGEN UR STRIP-MCNC: <2 MG/DL
WBC # BLD AUTO: 5.3 10E3/UL (ref 4–11)
WBC URINE: 0 /HPF

## 2023-01-29 PROCEDURE — 85025 COMPLETE CBC W/AUTO DIFF WBC: CPT | Performed by: FAMILY MEDICINE

## 2023-01-29 PROCEDURE — 36415 COLL VENOUS BLD VENIPUNCTURE: CPT | Performed by: FAMILY MEDICINE

## 2023-01-29 PROCEDURE — 99285 EMERGENCY DEPT VISIT HI MDM: CPT | Mod: 25

## 2023-01-29 PROCEDURE — 74176 CT ABD & PELVIS W/O CONTRAST: CPT

## 2023-01-29 PROCEDURE — 80048 BASIC METABOLIC PNL TOTAL CA: CPT | Performed by: FAMILY MEDICINE

## 2023-01-29 PROCEDURE — 250N000011 HC RX IP 250 OP 636: Performed by: FAMILY MEDICINE

## 2023-01-29 PROCEDURE — 81001 URINALYSIS AUTO W/SCOPE: CPT | Performed by: STUDENT IN AN ORGANIZED HEALTH CARE EDUCATION/TRAINING PROGRAM

## 2023-01-29 PROCEDURE — 96374 THER/PROPH/DIAG INJ IV PUSH: CPT

## 2023-01-29 RX ORDER — PHENAZOPYRIDINE HYDROCHLORIDE 100 MG/1
100 TABLET, FILM COATED ORAL 3 TIMES DAILY PRN
Qty: 6 TABLET | Refills: 0 | Status: SHIPPED | OUTPATIENT
Start: 2023-01-29

## 2023-01-29 RX ORDER — DICYCLOMINE HYDROCHLORIDE 10 MG/1
10 CAPSULE ORAL 4 TIMES DAILY PRN
Qty: 12 CAPSULE | Refills: 0 | Status: SHIPPED | OUTPATIENT
Start: 2023-01-29

## 2023-01-29 RX ORDER — DOCUSATE SODIUM 100 MG/1
100 CAPSULE, LIQUID FILLED ORAL 2 TIMES DAILY
Qty: 14 CAPSULE | Refills: 0 | Status: SHIPPED | OUTPATIENT
Start: 2023-01-29 | End: 2023-02-05

## 2023-01-29 RX ORDER — KETOROLAC TROMETHAMINE 15 MG/ML
15 INJECTION, SOLUTION INTRAMUSCULAR; INTRAVENOUS ONCE
Status: COMPLETED | OUTPATIENT
Start: 2023-01-29 | End: 2023-01-29

## 2023-01-29 RX ADMIN — KETOROLAC TROMETHAMINE 15 MG: 15 INJECTION, SOLUTION INTRAMUSCULAR; INTRAVENOUS at 10:22

## 2023-01-29 ASSESSMENT — ENCOUNTER SYMPTOMS
CHILLS: 0
FEVER: 0
DYSURIA: 1
FLANK PAIN: 1
HEMATURIA: 0
VOMITING: 0
ABDOMINAL PAIN: 1

## 2023-01-29 ASSESSMENT — ACTIVITIES OF DAILY LIVING (ADL): ADLS_ACUITY_SCORE: 35

## 2023-01-29 NOTE — ED TRIAGE NOTES
"Pt presents with lower right bad pain that began yesterday. Pt states there is a 'hot and burning\" feeling when she urinates. Denies fever, afebrile at this time.      Triage Assessment     Row Name 01/29/23 0947       Triage Assessment (Adult)    Airway WDL WDL       Respiratory WDL    Respiratory WDL WDL       Skin Circulation/Temperature WDL    Skin Circulation/Temperature WDL WDL       Cardiac WDL    Cardiac WDL WDL       Peripheral/Neurovascular WDL    Peripheral Neurovascular WDL WDL       Cognitive/Neuro/Behavioral WDL    Cognitive/Neuro/Behavioral WDL WDL              "
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
## 2023-01-30 ENCOUNTER — PATIENT OUTREACH (OUTPATIENT)
Dept: CARE COORDINATION | Facility: CLINIC | Age: 53
End: 2023-01-30
Payer: COMMERCIAL

## 2023-01-30 NOTE — PROGRESS NOTES
Clinic Care Coordination Contact    Follow Up Progress Note      Assessment: The pt was recently in the ED, I called to check up on the pt and help the pt setup a ED follow up. The pt was at [facility] for abdominal and back pain. I called and talked to the pt, pt stated that she is doing better. She stated that she is going to wait and see. She stated that if she needs a follow up, she will call the clinic.       Care Gaps:    Health Maintenance Due   Topic Date Due     ADVANCE CARE PLANNING  Never done     DEPRESSION ACTION PLAN  Never done     MAMMO SCREENING  Never done     HEPATITIS B IMMUNIZATION (1 of 3 - 3-dose series) Never done     PAP  Never done     DTAP/TDAP/TD IMMUNIZATION (4 - Td or Tdap) 05/08/1999     ZOSTER IMMUNIZATION (1 of 2) Never done     COVID-19 Vaccine (3 - Booster for Pfizer series) 07/16/2021     PHQ-9  12/21/2021     YEARLY PREVENTIVE VISIT  05/07/2022     INFLUENZA VACCINE (1) Never done     COLORECTAL CANCER SCREENING  09/20/2022           Care Plans      Intervention/Education provided during outreach:               Plan:     Care Coordinator will follow up in

## 2023-02-13 ENCOUNTER — OFFICE VISIT (OUTPATIENT)
Dept: FAMILY MEDICINE | Facility: CLINIC | Age: 53
End: 2023-02-13
Payer: COMMERCIAL

## 2023-02-13 VITALS
WEIGHT: 111 LBS | HEART RATE: 73 BPM | BODY MASS INDEX: 24.89 KG/M2 | SYSTOLIC BLOOD PRESSURE: 108 MMHG | TEMPERATURE: 98 F | RESPIRATION RATE: 20 BRPM | DIASTOLIC BLOOD PRESSURE: 67 MMHG | OXYGEN SATURATION: 97 %

## 2023-02-13 DIAGNOSIS — G47.00 INSOMNIA, UNSPECIFIED TYPE: Primary | ICD-10-CM

## 2023-02-13 DIAGNOSIS — F33.1 MODERATE EPISODE OF RECURRENT MAJOR DEPRESSIVE DISORDER (H): ICD-10-CM

## 2023-02-13 DIAGNOSIS — Z12.4 SCREENING FOR MALIGNANT NEOPLASM OF CERVIX: ICD-10-CM

## 2023-02-13 DIAGNOSIS — Z00.00 PREVENTATIVE HEALTH CARE: ICD-10-CM

## 2023-02-13 PROCEDURE — 87624 HPV HI-RISK TYP POOLED RSLT: CPT | Performed by: FAMILY MEDICINE

## 2023-02-13 PROCEDURE — 99215 OFFICE O/P EST HI 40 MIN: CPT | Performed by: FAMILY MEDICINE

## 2023-02-13 PROCEDURE — G0145 SCR C/V CYTO,THINLAYER,RESCR: HCPCS | Performed by: FAMILY MEDICINE

## 2023-02-13 RX ORDER — ESCITALOPRAM OXALATE 5 MG/1
5 TABLET ORAL DAILY
Qty: 60 TABLET | Refills: 0 | Status: SHIPPED | OUTPATIENT
Start: 2023-02-13

## 2023-02-13 ASSESSMENT — ENCOUNTER SYMPTOMS
JOINT SWELLING: 0
SHORTNESS OF BREATH: 0
WEAKNESS: 0
ABDOMINAL PAIN: 1
ARTHRALGIAS: 0
HEMATURIA: 0
FREQUENCY: 0
DYSURIA: 0
EYE PAIN: 0
NAUSEA: 0
HEARTBURN: 0
BREAST MASS: 0
CONSTIPATION: 0
HEADACHES: 0
HEMATOCHEZIA: 0
PARESTHESIAS: 0
MYALGIAS: 0
SORE THROAT: 0
COUGH: 0
DIZZINESS: 0
NERVOUS/ANXIOUS: 1
PALPITATIONS: 0
FEVER: 0
CHILLS: 0
DIARRHEA: 0

## 2023-02-13 ASSESSMENT — PATIENT HEALTH QUESTIONNAIRE - PHQ9
SUM OF ALL RESPONSES TO PHQ QUESTIONS 1-9: 12
10. IF YOU CHECKED OFF ANY PROBLEMS, HOW DIFFICULT HAVE THESE PROBLEMS MADE IT FOR YOU TO DO YOUR WORK, TAKE CARE OF THINGS AT HOME, OR GET ALONG WITH OTHER PEOPLE: SOMEWHAT DIFFICULT
SUM OF ALL RESPONSES TO PHQ QUESTIONS 1-9: 12

## 2023-02-13 NOTE — PATIENT INSTRUCTIONS
- I'd strongly recommend restarting therapy to help with your symptoms and insomnia. We will reach out to coordinate this for you.     - Start escitalopram 5 mg at bedtime. Take this every day. If doing okay on the medication, we can increase to 10 mg in two weeks.     - Continue to work on reaching out to others, setting up activities, getting time outside, and eating regular meals.     - Follow up in 2 weeks to check back on your symptoms.

## 2023-02-13 NOTE — PROGRESS NOTES
"  Assessment & Plan     Insomnia, unspecified type  Suspect that underlying mood, and possibly anxiety disorder, is a major contributor. Issues with initiation and maintenance. Not responding to over the counter melatonin.   - Adult Mental Health  Referral; Future  - Begin escitalopram (LEXAPRO) 5 MG tablet; Take 1 tablet (5 mg) by mouth daily; starting with very low dose given concerns for potential side effects.    Moderate episode of recurrent major depressive disorder (H)  History of major depression. PHQ-9 of 12 today. Positive for passive SI but no intent or specific plans. May also have a component of anxiety.  - Adult Mental Health  Referral; Future  - escitalopram (LEXAPRO) 5 MG tablet; Take 1 tablet (5 mg) by mouth daily  - Encouraged taking time for herself each day, regular social events, physical activity, and time outside.   - Contracted for safety.   - Follow up in 2 weeks - sooner if needed for any worsening symptoms or inability to tolerate the Lexapro.     Screening for malignant neoplasm of cervix  Previous normal pap smears. Last pap was NILM and HPV negative in 2017. Routine screening one year overdue today with co-testing.  - Pap screen with HPV - recommended age 30 - 65 years    Preventive health care  Did not have adequate time to complete a physical and all preventive health care tasks due to mental health concerns today.   - Will continue to address preventive health care needs at follow up visits.     45 minutes spent on the date of the encounter doing chart review, patient visit and documentation        BMI:   Estimated body mass index is 24.89 kg/m  as calculated from the following:    Height as of 1/29/23: 1.422 m (4' 8\").    Weight as of this encounter: 50.3 kg (111 lb).       Depression Screening Follow Up    PHQ 2/13/2023   PHQ-9 Total Score 12   Q9: Thoughts of better off dead/self-harm past 2 weeks Several days   F/U: Thoughts of suicide or self-harm No   F/U: " "Safety concerns Yes     Follow Up      Follow Up Actions Taken  Mental Health Referral placed  Close follow up in 2 weeks      April PARMINDER Kraus MD  [facility]    Elias Lezama is a 52 year old, presenting for the following health issues:  Initially scheduled as a physical, Decided to change to a problem-based visit to focus on insomnia and mood given patient's preference to discuss these today.     Due to language barrier, an  was present during the history-taking and subsequent discussion (and for part of the physical exam) with this patient.    HPI     Feels like she has not been sleeping well for a long time. She was seeing a counselor and possibly was taking a medication for sleep from a psychiatrist. She cannot recall the name of the medication. Per chart review, it may have been an SSRI.  She stopped the previous pill because it caused her to feel confused. She doesn't recall how long she took it. She is only sleeping for 2-3 hours at a time. Struggling with sleep initiation AND maintenance.  Right now she is taking melatonin and drinking tea and this doesn't seem to be working.     Also reporting low mood, little interest in things, and feeling bad about herself. Having some thoughts of passive \"is it all worth it\" but no specific thoughts or plan for self-harm/suicide.    She agrees that her mood is likely a part of the problem that is contributing to her insomnia. She had problems in the past with her family, especially her , and feels like this is still lingering. She is worried about taking a medication due to tolerance and difficulty with potential for withdrawal.     She is no longer doing therapy. It was too hard to get in due to a lack of rides. She was previously doing teletherapy and found this helpful. She may be open to restarting therapy.             Objective    /67   Pulse 73   Temp 98  F (36.7  C) (Oral)   Resp 20   Wt 50.3 kg (111 " lb)   SpO2 97%   BMI 24.89 kg/m    Body mass index is 24.89 kg/m .  Physical Exam   GENERAL: fatigued in appearance but alert  RESP: normal rate and effort  CV: regular rate and rhythm, no peripheral edema and peripheral pulses strong   (female): normal female external genitalia, normal urethral meatus, vaginal mucosa, normal cervix/adnexa/uterus without masses or discharge  MS: no gross musculoskeletal defects noted, no edema  PSYCH: mentation appears normal, affect appears anxious and down, well-dressed and well-groomed      PHQ 6/21/2021 2/13/2023   PHQ-9 Total Score 6 12   Q9: Thoughts of better off dead/self-harm past 2 weeks Not at all Several days   F/U: Thoughts of suicide or self-harm - No   F/U: Safety concerns - Yes       PATIENT HEALTH QUESTIONNAIRE-9 (PHQ - 9)    Over the last 2 weeks, how often have you been bothered by any of the following problems?    1. Little interest or pleasure in doing things -  Nearly every day   2. Feeling down, depressed, or hopeless -  Several days   3. Trouble falling or staying asleep, or sleeping too much - Nearly every day   4. Feeling tired or having little energy -  Several days   5. Poor appetite or overeating -  Not at all   6. Feeling bad about yourself - or that you are a failure or have let yourself or your family down -  Several days   7. Trouble concentrating on things, such as reading the newspaper or watching television - More than half the days   8. Moving or speaking so slowly that other people could have noticed? Or the opposite - being so fidgety or restless that you have been moving around a lot more than usual Not at all   9. Thoughts that you would be better off dead or of hurting  yourself in some way Several days   Total Score: 12     If you checked off any problems, how difficult have these problems made it for you to do your work, take care of things at home, or get along with other people?      Developed by Suad Cobos, Malorie WOODS  Mich Lewis and colleagues, with an educational shahid from [employer] No permission required to reproduce, translate, display or distribute. permission required to reproduce, translate, display or distribute.

## 2023-02-16 ENCOUNTER — MYC MEDICAL ADVICE (OUTPATIENT)
Dept: CARE COORDINATION | Facility: CLINIC | Age: 53
End: 2023-02-16
Payer: COMMERCIAL

## 2023-02-16 ENCOUNTER — TELEPHONE (OUTPATIENT)
Dept: FAMILY MEDICINE | Facility: CLINIC | Age: 53
End: 2023-02-16
Payer: COMMERCIAL

## 2023-02-16 LAB
BKR LAB AP GYN ADEQUACY: NORMAL
BKR LAB AP GYN INTERPRETATION: NORMAL
BKR LAB AP HPV REFLEX: NORMAL
BKR LAB AP PREVIOUS ABNORMAL: NORMAL
PATH REPORT.COMMENTS IMP SPEC: NORMAL
PATH REPORT.COMMENTS IMP SPEC: NORMAL
PATH REPORT.RELEVANT HX SPEC: NORMAL

## 2023-02-16 NOTE — TELEPHONE ENCOUNTER
[facility] phone call message- general phone call:    Reason for call: Aaliyah daughter of patient called stating her mom have some concerns about the pap she had with Dr. Kraus on 2/13/23. Patient is experiencing bleeding and pain, her cervix feels prickly and intense pain. As of today the bleeding has stopped but is still having pain and hurts more when she sits. Daughter stated her mom is wondering if skin was taken from her cervix without her knowing because the pain at the pap felt like she was being pinched and it was more painful than any other pap she's done before. Would like to discuss with Dr. Kraus. Please call and advise thank you.    Daughter does not have consent on file to discuss so please call patient with  thank you.    Return call needed: Yes    OK to leave a message on voice mail? Yes    Primary language: Hmong      needed? Yes    Call taken on February 16, 2023 at 3:45 PM by Yayo Waldron

## 2023-02-17 ENCOUNTER — APPOINTMENT (OUTPATIENT)
Dept: INTERPRETER SERVICES | Facility: CLINIC | Age: 53
End: 2023-02-17
Payer: COMMERCIAL

## 2023-02-17 LAB
HUMAN PAPILLOMA VIRUS 16 DNA: NEGATIVE
HUMAN PAPILLOMA VIRUS 18 DNA: NEGATIVE
HUMAN PAPILLOMA VIRUS FINAL DIAGNOSIS: NORMAL
HUMAN PAPILLOMA VIRUS OTHER HR: NEGATIVE

## 2023-02-17 NOTE — TELEPHONE ENCOUNTER
I'm sorry to hear that Lise is having symptoms after her pap smear. I tried to call her to discuss her symptoms further but reached her voice mail. I left a message with a [facility]s help to ask Lise to reach out to discuss her symptoms further as they are not typical for the exam and may warrant further evaluation.     Can we please try to reach out to her again this afternoon to clarify her symptoms and to arrange follow up. She had very mild spotting during the pap smear that is common among postmenopausal women but nothing else about her exam was atypical. If she continues to have pain and bleeding, she should be reevaluated in clinic.     Thanks!    Elicia Kraus MD

## 2023-02-21 ENCOUNTER — TELEPHONE (OUTPATIENT)
Dept: FAMILY MEDICINE | Facility: CLINIC | Age: 53
End: 2023-02-21
Payer: COMMERCIAL

## 2023-02-21 NOTE — TELEPHONE ENCOUNTER
[facility] phone call message- general phone call:    Reason for call: Shanthi Gottlieb, from [facility] is calling in regards to patient medication escitalopram (LEXAPRO) 5 MG tablet is making her feel drunk and feels like there's ice on her head. Shanthi wanted to let PCP know about the side affects that is making patient feels. Any questions can call her back on [phone]. Please call and advise, thank you.     Return call needed: Yes    OK to leave a message on voice mail? Yes    Primary language: ong      needed? Yes    Call taken on February 21, 2023 at 4:38 PM by Alban Martinez

## 2023-02-22 PROBLEM — Z12.4 SCREENING FOR MALIGNANT NEOPLASM OF CERVIX: Status: ACTIVE | Noted: 2017-03-06

## 2023-02-22 NOTE — TELEPHONE ENCOUNTER
Can we please reach out to Lise to share the following message:    1. Her symptoms are concerning for an intolerance of the Lexapro medication. Please advise her to discontinue the medication and to follow up as scheduled on Friday with Dr. Fernandes to reassess her mood, symptoms, and alternative medication options.     2. I also want to check back on her pelvic discomfort and vaginal bleeding after her pap smear. The pap smear results were normal, which means that she will not need further testing until 2/2028 (5 years). However, I am concerned with the symptoms that she had after this screening test and want to make sure that she is feeling better. We can also reassess this on Friday.     Thanks!    Elicia Kraus MD

## 2023-02-23 ENCOUNTER — APPOINTMENT (OUTPATIENT)
Dept: INTERPRETER SERVICES | Facility: CLINIC | Age: 53
End: 2023-02-23
Payer: COMMERCIAL

## 2023-07-08 ENCOUNTER — HEALTH MAINTENANCE LETTER (OUTPATIENT)
Age: 53
End: 2023-07-08

## 2023-08-04 ENCOUNTER — OFFICE VISIT (OUTPATIENT)
Dept: FAMILY MEDICINE | Facility: CLINIC | Age: 53
End: 2023-08-04
Payer: COMMERCIAL

## 2023-08-04 VITALS
WEIGHT: 113 LBS | RESPIRATION RATE: 20 BRPM | BODY MASS INDEX: 25.33 KG/M2 | SYSTOLIC BLOOD PRESSURE: 101 MMHG | DIASTOLIC BLOOD PRESSURE: 65 MMHG | HEART RATE: 64 BPM

## 2023-08-04 DIAGNOSIS — G56.02 CARPAL TUNNEL SYNDROME OF LEFT WRIST: ICD-10-CM

## 2023-08-04 DIAGNOSIS — M65.4 DE QUERVAIN'S DISEASE (TENOSYNOVITIS): Primary | ICD-10-CM

## 2023-08-04 PROCEDURE — 99213 OFFICE O/P EST LOW 20 MIN: CPT | Mod: GC

## 2023-08-04 RX ORDER — ACETAMINOPHEN 500 MG
500-1000 TABLET ORAL EVERY 6 HOURS PRN
Qty: 90 TABLET | Refills: 3 | Status: SHIPPED | OUTPATIENT
Start: 2023-08-04

## 2023-08-04 RX ORDER — PSEUDOEPHED/ACETAMINOPH/DIPHEN 30MG-500MG
TABLET ORAL
COMMUNITY
Start: 2022-12-27

## 2023-08-04 RX ORDER — IBUPROFEN 400 MG/1
400 TABLET, FILM COATED ORAL
COMMUNITY
Start: 2022-12-27

## 2023-08-04 ASSESSMENT — PATIENT HEALTH QUESTIONNAIRE - PHQ9: SUM OF ALL RESPONSES TO PHQ QUESTIONS 1-9: 13

## 2023-08-04 NOTE — PROGRESS NOTES
Preceptor Attestation:  Patient's case reviewed and discussed with the resident, Odin Rojas MD, and I personally evaluated the patient. I agree with written assessment and plan of care.    Supervising Physician:  Elicia Kraus MD   [facility]

## 2023-08-04 NOTE — PROGRESS NOTES
"  Assessment & Plan     (M65.4) De Quervain's disease (tenosynovitis)  (primary encounter diagnosis)  (G56.02) Carpal tunnel syndrome of left wrist  Left Wrist pain  Comment: Patient with left wrist and thumb pain. Also associated paresthesias, weakness, and clumsiness of the left hand. Positive Finkelstein test on exam suggestive of De Quervain's disease. Patient also with positive Phalen, Tinel, and Durkan tests on exam. Patient likely has De. Quervain's disease +/- concomitant carpal tunnel syndrome. We will splint the thumb targeting Dr Purcell and have close follow up in 4 weeks to evaluate for resolution of symptoms.   Plan:   THUMB SPICA SPLINT - Wear all day and night with a few short breaks daily.   Unable to pursue NSAID burst due to history of ulcers       Topical gel, acetaminophen (TYLENOL) 500 MG tablet       Voltaren gel up to QID prn on the left wrist   Follow up in 4 weeks          BMI:   Estimated body mass index is 25.33 kg/m  as calculated from the following:    Height as of 1/29/23: 1.422 m (4' 8\").    Weight as of this encounter: 51.3 kg (113 lb).       Return in about 4 weeks (around 9/1/2023) for Follow up.    Odin Rojas MD  [facility]      Elias Lezama is a 52 year old, presenting for the following health issues:  L wrist pain  (L wrist pain (3 months ago) stated with shoulder pain and now with the wrist pain)      8/4/2023     2:10 PM   Additional Questions   Roomed by Kalani   Accompanied by self       HPI       Patient has been having worsening left wrist pain for the last 3 months. The pain is on the back and front side of her wrist on the the thumb side. It's painful in the wrist and extends into the thumb and occasionally the other fingers. Activity worsens the pain as does grabbing her wrist. Rest and ice helps. Pain can be worsened by activity and household chores. Cold presses and tylenol help with the pain.    She has associated tingling and " numbness of the left thumb and fingers. Also has weakness and clumsiness of the left hand.             Objective    /65   Pulse 64   Resp 20   Wt 51.3 kg (113 lb)   BMI 25.33 kg/m    Body mass index is 25.33 kg/m .  Physical Exam   GENERAL: healthy, alert and no distress  RESP: lungs clear to auscultation - no rales, rhonchi or wheezes  CV: regular rate and rhythm, normal S1 S2, no S3 or S4, no murmur, click or rub, no peripheral edema and peripheral pulses strong  MUSCULOSKELETAL: Left wrist positive for Finkelstein, phalen, tinel, and durkan tests. Strength 5/5 in proximal upper extremities. 3-4/5 strength with wrist flexion and extension bilaterally. Finger  strength 4/5 in right hand and 3/5 in left hand. No thenar atrophy. Left thenar adduction 2/5 strength and 3-4/5 with right thenar adduction.

## 2023-09-16 ENCOUNTER — HEALTH MAINTENANCE LETTER (OUTPATIENT)
Age: 53
End: 2023-09-16

## 2024-08-31 ENCOUNTER — HEALTH MAINTENANCE LETTER (OUTPATIENT)
Age: 54
End: 2024-08-31

## 2024-11-21 ENCOUNTER — NURSE TRIAGE (OUTPATIENT)
Dept: FAMILY MEDICINE | Facility: CLINIC | Age: 54
End: 2024-11-21
Payer: COMMERCIAL

## 2024-11-21 NOTE — TELEPHONE ENCOUNTER
Symptoms    Describe your symptoms: Periodic episodes of Irregular feeling heart beat/anxiety like feeling/sometimes chest pain and shortness of breathe     Any pain: Yes: Sometimes in the chest    How long have you been having symptoms: 2 weeks  She did say it started after she had her tooth pulled 2 weeks ago.    Have you been seen for this:  No    Appointment offered?: Yes: scheduled for tomorrow.    Triage offered?: Yes:       Preferred Pharmacy:   [id number] - [place], [place] - [address] [facility] [address]  [address]  [place] [address]  Phone: [phone] Fax: [phone]      Could we send this information to you in [employer] or would you prefer to receive a phone call?:   Patient would prefer a phone call   Okay to leave a detailed message?: Yes at Cell number on file:    Telephone Information:   Mobile [phone]        Periodic episodes of Irregular feeling heart beat/anxiety like feeling/sometimes chest pain and shortness of breathe

## 2024-11-21 NOTE — TELEPHONE ENCOUNTER
Triage called pt back within 5 minutes of receiving message. Rlee RN  Reason for Disposition    Patient wants to be seen    Additional Information    Negative: Passed out (e.g., fainted, lost consciousness, blacked out and was not responding)    Negative: Shock suspected (e.g., cold/pale/clammy skin, too weak to stand, low BP, rapid pulse)    Negative: Difficult to awaken or acting confused (e.g., disoriented, slurred speech)    Negative: Visible sweat on face or sweat dripping down face    Negative: Unable to walk, or can only walk with assistance (e.g., requires support)    Negative: Received SHOCK from implantable cardiac defibrillator and has persisting symptoms (i.e., palpitations, lightheadedness)    Negative: Feeling weak or lightheaded (e.g., woozy, feeling like they might faint) AND heart beating very rapidly (e.g., > 140 / minute)    Negative: Feeling weak or lightheaded (e.g., woozy, feeling like they might faint) AND heart beating very slowly (e.g., < 50 / minute)    Negative: Sounds like a life-threatening emergency to the triager    Negative: Chest pain    Negative: Difficulty breathing    Negative: Feeling weak or lightheaded (e.g., woozy, feeling like they might faint)    Negative: Heart beating very rapidly (e.g., > 140 / minute) and present now  (Exception: During exercise.)    Negative: Heart beating very slowly (e.g., < 50 / minute)  (Exception: Athlete and heart rate normal for caller.)    Negative: New or worsened shortness of breath with activity (dyspnea on exertion)    Negative: Heart beating very rapidly (e.g., > 140 / minute) and not present now  (Exception: During exercise.)    Negative: Skipped or extra beat(s) and increases with exercise or exertion    Negative: Skipped or extra beat(s) and occurs 4 or more times per minute    Negative: History of heart disease (i.e., heart attack, bypass surgery, angina, angioplasty)  (Exception: Brief heartbeat symptoms that went away and now feels  "well.)    Negative: Age > 60 years  (Exception: Brief heartbeat symptoms that went away and now feels well.)    Negative: Taking water pill (i.e., diuretic) or heart medication (e.g., digoxin)    Negative: Patient sounds very sick or weak to the triager    Negative: Wearing a cardiac monitor (e.g., cardiac event, Holter)    Negative: Received SHOCK from implantable cardiac defibrillator (and now feels well)    Answer Assessment - Initial Assessment Questions  1. DESCRIPTION: feels like a \"flickering\" sensation in left side of chest.    2. ONSET: noticed for past two weeks, intermittent episodes.    3. DURATION: lasting 1-2 seconds    4. PATTERN : no known trigger or association with physical exertion. Has even tried jumping up and down in place or jogging to see if able to trigger, no trigger.    5. TAP: patient unable to determine and continues to describe abnormal sensation as flickering or electrical zap.    6. HEART RATE: unable to provide triage    7. RECURRENT SYMPTOM: yes, has occurred intermittent x last two weeks.    8. CAUSE: unable to identify cause. Not taking any herbal medication, no caffeine intake.    9. CARDIAC HISTORY:  denies personal or family medical history of cardiac issues or history.    10. OTHER SYMPTOMS: just 3-4 days ago while laying down, noticed quick, sharp chest pains but went away within 1-2 seconds.  Occassionally feels winded, not necessarily shortness of breath.    11. PREGNANCY: n/a    Protocols used: Heart Rate and Heartbeat [id number]    "

## 2024-11-26 ENCOUNTER — APPOINTMENT (OUTPATIENT)
Dept: INTERPRETER SERVICES | Facility: CLINIC | Age: 54
End: 2024-11-26
Payer: COMMERCIAL

## 2024-12-08 ENCOUNTER — APPOINTMENT (OUTPATIENT)
Dept: CT IMAGING | Facility: HOSPITAL | Age: 54
End: 2024-12-08
Attending: STUDENT IN AN ORGANIZED HEALTH CARE EDUCATION/TRAINING PROGRAM

## 2024-12-08 ENCOUNTER — HOSPITAL ENCOUNTER (EMERGENCY)
Facility: HOSPITAL | Age: 54
Discharge: HOME | End: 2024-12-08
Attending: STUDENT IN AN ORGANIZED HEALTH CARE EDUCATION/TRAINING PROGRAM | Admitting: STUDENT IN AN ORGANIZED HEALTH CARE EDUCATION/TRAINING PROGRAM

## 2024-12-08 VITALS
SYSTOLIC BLOOD PRESSURE: 94 MMHG | TEMPERATURE: 98.5 F | HEIGHT: 56 IN | BODY MASS INDEX: 26.32 KG/M2 | WEIGHT: 117 LBS | OXYGEN SATURATION: 97 % | RESPIRATION RATE: 18 BRPM | HEART RATE: 63 BPM | DIASTOLIC BLOOD PRESSURE: 60 MMHG

## 2024-12-08 DIAGNOSIS — E87.1 HYPONATREMIA: ICD-10-CM

## 2024-12-08 DIAGNOSIS — R10.13 EPIGASTRIC PAIN: ICD-10-CM

## 2024-12-08 DIAGNOSIS — E87.6 HYPOKALEMIA: ICD-10-CM

## 2024-12-08 LAB
ALBUMIN SERPL BCG-MCNC: 4 G/DL (ref 3.5–5.2)
ALP SERPL-CCNC: 73 U/L (ref 40–150)
ALT SERPL W P-5'-P-CCNC: 23 U/L (ref 0–50)
ANION GAP SERPL CALCULATED.3IONS-SCNC: 11 MMOL/L (ref 7–15)
ANION GAP SERPL CALCULATED.3IONS-SCNC: 9 MMOL/L (ref 7–15)
AST SERPL W P-5'-P-CCNC: 26 U/L (ref 0–45)
BASOPHILS # BLD AUTO: 0 10E3/UL (ref 0–0.2)
BASOPHILS NFR BLD AUTO: 0 %
BILIRUB DIRECT SERPL-MCNC: <0.2 MG/DL (ref 0–0.3)
BILIRUB SERPL-MCNC: 0.8 MG/DL
BUN SERPL-MCNC: 7 MG/DL (ref 6–20)
BUN SERPL-MCNC: 8.5 MG/DL (ref 6–20)
CALCIUM SERPL-MCNC: 8.6 MG/DL (ref 8.8–10.4)
CALCIUM SERPL-MCNC: 8.7 MG/DL (ref 8.8–10.4)
CHLORIDE SERPL-SCNC: 100 MMOL/L (ref 98–107)
CHLORIDE SERPL-SCNC: 93 MMOL/L (ref 98–107)
CREAT SERPL-MCNC: 0.6 MG/DL (ref 0.51–0.95)
CREAT SERPL-MCNC: 0.62 MG/DL (ref 0.51–0.95)
EGFRCR SERPLBLD CKD-EPI 2021: >90 ML/MIN/1.73M2
EGFRCR SERPLBLD CKD-EPI 2021: >90 ML/MIN/1.73M2
EOSINOPHIL # BLD AUTO: 0 10E3/UL (ref 0–0.7)
EOSINOPHIL NFR BLD AUTO: 0 %
ERYTHROCYTE [DISTWIDTH] IN BLOOD BY AUTOMATED COUNT: 12 % (ref 10–15)
GLUCOSE SERPL-MCNC: 105 MG/DL (ref 70–99)
GLUCOSE SERPL-MCNC: 110 MG/DL (ref 70–99)
HCO3 SERPL-SCNC: 24 MMOL/L (ref 22–29)
HCO3 SERPL-SCNC: 24 MMOL/L (ref 22–29)
HCT VFR BLD AUTO: 33.9 % (ref 35–47)
HGB BLD-MCNC: 11.9 G/DL (ref 11.7–15.7)
IMM GRANULOCYTES # BLD: 0 10E3/UL
IMM GRANULOCYTES NFR BLD: 0 %
LIPASE SERPL-CCNC: 12 U/L (ref 13–60)
LYMPHOCYTES # BLD AUTO: 1.9 10E3/UL (ref 0.8–5.3)
LYMPHOCYTES NFR BLD AUTO: 27 %
MAGNESIUM SERPL-MCNC: 1.8 MG/DL (ref 1.7–2.3)
MCH RBC QN AUTO: 30.5 PG (ref 26.5–33)
MCHC RBC AUTO-ENTMCNC: 35.1 G/DL (ref 31.5–36.5)
MCV RBC AUTO: 87 FL (ref 78–100)
MONOCYTES # BLD AUTO: 0.4 10E3/UL (ref 0–1.3)
MONOCYTES NFR BLD AUTO: 5 %
NEUTROPHILS # BLD AUTO: 4.8 10E3/UL (ref 1.6–8.3)
NEUTROPHILS NFR BLD AUTO: 67 %
NRBC # BLD AUTO: 0 10E3/UL
NRBC BLD AUTO-RTO: 0 /100
PLATELET # BLD AUTO: 188 10E3/UL (ref 150–450)
POTASSIUM SERPL-SCNC: 2.7 MMOL/L (ref 3.4–5.3)
POTASSIUM SERPL-SCNC: 3.7 MMOL/L (ref 3.4–5.3)
PROT SERPL-MCNC: 6.5 G/DL (ref 6.4–8.3)
RBC # BLD AUTO: 3.9 10E6/UL (ref 3.8–5.2)
SODIUM SERPL-SCNC: 128 MMOL/L (ref 135–145)
SODIUM SERPL-SCNC: 133 MMOL/L (ref 135–145)
TROPONIN T SERPL HS-MCNC: <6 NG/L
WBC # BLD AUTO: 7.2 10E3/UL (ref 4–11)

## 2024-12-08 PROCEDURE — 96375 TX/PRO/DX INJ NEW DRUG ADDON: CPT

## 2024-12-08 PROCEDURE — 84484 ASSAY OF TROPONIN QUANT: CPT | Performed by: STUDENT IN AN ORGANIZED HEALTH CARE EDUCATION/TRAINING PROGRAM

## 2024-12-08 PROCEDURE — 74177 CT ABD & PELVIS W/CONTRAST: CPT

## 2024-12-08 PROCEDURE — 82310 ASSAY OF CALCIUM: CPT | Performed by: STUDENT IN AN ORGANIZED HEALTH CARE EDUCATION/TRAINING PROGRAM

## 2024-12-08 PROCEDURE — 93005 ELECTROCARDIOGRAM TRACING: CPT | Performed by: STUDENT IN AN ORGANIZED HEALTH CARE EDUCATION/TRAINING PROGRAM

## 2024-12-08 PROCEDURE — 96366 THER/PROPH/DIAG IV INF ADDON: CPT

## 2024-12-08 PROCEDURE — 250N000011 HC RX IP 250 OP 636: Performed by: STUDENT IN AN ORGANIZED HEALTH CARE EDUCATION/TRAINING PROGRAM

## 2024-12-08 PROCEDURE — 83690 ASSAY OF LIPASE: CPT | Performed by: STUDENT IN AN ORGANIZED HEALTH CARE EDUCATION/TRAINING PROGRAM

## 2024-12-08 PROCEDURE — 85014 HEMATOCRIT: CPT | Performed by: STUDENT IN AN ORGANIZED HEALTH CARE EDUCATION/TRAINING PROGRAM

## 2024-12-08 PROCEDURE — 36415 COLL VENOUS BLD VENIPUNCTURE: CPT | Performed by: STUDENT IN AN ORGANIZED HEALTH CARE EDUCATION/TRAINING PROGRAM

## 2024-12-08 PROCEDURE — 83735 ASSAY OF MAGNESIUM: CPT | Performed by: STUDENT IN AN ORGANIZED HEALTH CARE EDUCATION/TRAINING PROGRAM

## 2024-12-08 PROCEDURE — 82565 ASSAY OF CREATININE: CPT | Performed by: STUDENT IN AN ORGANIZED HEALTH CARE EDUCATION/TRAINING PROGRAM

## 2024-12-08 PROCEDURE — 96365 THER/PROPH/DIAG IV INF INIT: CPT

## 2024-12-08 PROCEDURE — 85004 AUTOMATED DIFF WBC COUNT: CPT | Performed by: STUDENT IN AN ORGANIZED HEALTH CARE EDUCATION/TRAINING PROGRAM

## 2024-12-08 PROCEDURE — 82040 ASSAY OF SERUM ALBUMIN: CPT | Performed by: STUDENT IN AN ORGANIZED HEALTH CARE EDUCATION/TRAINING PROGRAM

## 2024-12-08 PROCEDURE — 99285 EMERGENCY DEPT VISIT HI MDM: CPT | Mod: 25

## 2024-12-08 PROCEDURE — 250N000013 HC RX MED GY IP 250 OP 250 PS 637: Performed by: STUDENT IN AN ORGANIZED HEALTH CARE EDUCATION/TRAINING PROGRAM

## 2024-12-08 PROCEDURE — 258N000003 HC RX IP 258 OP 636: Performed by: STUDENT IN AN ORGANIZED HEALTH CARE EDUCATION/TRAINING PROGRAM

## 2024-12-08 PROCEDURE — 82248 BILIRUBIN DIRECT: CPT | Performed by: STUDENT IN AN ORGANIZED HEALTH CARE EDUCATION/TRAINING PROGRAM

## 2024-12-08 RX ORDER — MAGNESIUM HYDROXIDE/ALUMINUM HYDROXICE/SIMETHICONE 120; 1200; 1200 MG/30ML; MG/30ML; MG/30ML
15 SUSPENSION ORAL ONCE
Status: COMPLETED | OUTPATIENT
Start: 2024-12-08 | End: 2024-12-08

## 2024-12-08 RX ORDER — OMEPRAZOLE 40 MG/1
40 CAPSULE, DELAYED RELEASE ORAL DAILY
Qty: 30 CAPSULE | Refills: 3 | Status: SHIPPED | OUTPATIENT
Start: 2024-12-08

## 2024-12-08 RX ORDER — POTASSIUM CHLORIDE 7.45 MG/ML
10 INJECTION INTRAVENOUS ONCE
Status: COMPLETED | OUTPATIENT
Start: 2024-12-08 | End: 2024-12-08

## 2024-12-08 RX ORDER — IOPAMIDOL 755 MG/ML
80 INJECTION, SOLUTION INTRAVASCULAR ONCE
Status: COMPLETED | OUTPATIENT
Start: 2024-12-08 | End: 2024-12-08

## 2024-12-08 RX ORDER — ONDANSETRON 2 MG/ML
4 INJECTION INTRAMUSCULAR; INTRAVENOUS ONCE
Status: COMPLETED | OUTPATIENT
Start: 2024-12-08 | End: 2024-12-08

## 2024-12-08 RX ORDER — SUCRALFATE ORAL 1 G/10ML
1 SUSPENSION ORAL 4 TIMES DAILY PRN
Qty: 414 ML | Refills: 0 | Status: SHIPPED | OUTPATIENT
Start: 2024-12-08

## 2024-12-08 RX ORDER — POTASSIUM CHLORIDE 1.5 G/1.58G
40 POWDER, FOR SOLUTION ORAL ONCE
Status: COMPLETED | OUTPATIENT
Start: 2024-12-08 | End: 2024-12-08

## 2024-12-08 RX ADMIN — ALUMINUM HYDROXIDE, MAGNESIUM HYDROXIDE, AND SIMETHICONE 15 ML: 1200; 120; 1200 SUSPENSION ORAL at 07:41

## 2024-12-08 RX ADMIN — SODIUM CHLORIDE 500 ML: 9 INJECTION, SOLUTION INTRAVENOUS at 08:19

## 2024-12-08 RX ADMIN — FAMOTIDINE 20 MG: 10 INJECTION, SOLUTION INTRAVENOUS at 07:40

## 2024-12-08 RX ADMIN — IOPAMIDOL 80 ML: 755 INJECTION, SOLUTION INTRAVENOUS at 08:47

## 2024-12-08 RX ADMIN — POTASSIUM CHLORIDE 40 MEQ: 1.5 POWDER, FOR SOLUTION ORAL at 08:23

## 2024-12-08 RX ADMIN — POTASSIUM CHLORIDE 10 MEQ: 7.46 INJECTION, SOLUTION INTRAVENOUS at 08:20

## 2024-12-08 RX ADMIN — ONDANSETRON 4 MG: 2 INJECTION INTRAMUSCULAR; INTRAVENOUS at 07:39

## 2024-12-08 ASSESSMENT — COLUMBIA-SUICIDE SEVERITY RATING SCALE - C-SSRS
2. HAVE YOU ACTUALLY HAD ANY THOUGHTS OF KILLING YOURSELF IN THE PAST MONTH?: NO
6. HAVE YOU EVER DONE ANYTHING, STARTED TO DO ANYTHING, OR PREPARED TO DO ANYTHING TO END YOUR LIFE?: NO
1. IN THE PAST MONTH, HAVE YOU WISHED YOU WERE DEAD OR WISHED YOU COULD GO TO SLEEP AND NOT WAKE UP?: NO

## 2024-12-08 ASSESSMENT — ACTIVITIES OF DAILY LIVING (ADL)
ADLS_ACUITY_SCORE: 41

## 2024-12-08 NOTE — ED PROVIDER NOTES
NAME: Lise Ross  AGE: 53 year old female  YOB: 1970  MRN: [id number]  EVALUATION DATE & TIME: 2024  7:06 AM    PCP: Elicia Kraus  ED PROVIDER: Allyson Roca MD.    Chief Complaint   Patient presents with    Abdominal Pain       FINAL IMPRESSION:  1. Epigastric pain    2. Hyponatremia    3. Hypokalemia        MEDICAL DECISION MAKIN:18 AM I met with the patient, obtained history, performed an initial exam, and discussed options and plan for diagnostics and treatment here in the ED.   8:12 AM I rechecked with the patient and updated them on the future plan of care while in the emergency department.   11:42 AM I rechecked with the patient, updating them on the future plan of care.   12:12 PM The patient was discharged from the emergency department.     52 y/o F who presnets with abdominal pain. She reports upper abodminal pain she describes as burning and is assocaited with an episode of vomiting today.    Dx includes but not limited to gastritis, ulcer, dyspepsia, pancreatitis, appendicitis, cholelithiasis, cholecystitis, diverticulitis, viral infection, etc (PID/ovarian pathology unlikely given area of pain). Also considered atypical ACS as burning does spread some to the chest. EKG is sinus rhythm with nonspecific ST changes, Qtc 460 ,troponin <6 and with onset >6 hours ago do not feel needs repeat and overall low suspicion for ACS. Given nature of pain/symptoms unlikely to be dissection, PE, pericarditis, esophageal rupture, etc.     She felt much improved after GI cocktail, pepcid and zofran here. She refused a chest xray. CT abd/pelvis did not show any acute findings.  Labs showed Na 128 and K 2.7, suspect from decreased PO intake, replacements given and repeat values were improved.    Considered admission but with improvement in pain, able to tolerate PO, improvement in electrolytes, she is in agreement with discharge with close outpatient follow up. Strict return  precautions discussed.     Medical Decision Making  Obtained supplemental history:Supplemental history obtained?: Family Member/Significant Other  Reviewed external records: External records reviewed?: Outpatient Record: 11/22/2024, [facility] Care impacted by chronic illness:Hyperlipidemia  Did you consider but not order tests?: Work up considered but not performed and documented in chart, if applicable  Did you interpret images independently?: Independent interpretation of ECG and images noted in documentation, when applicable.  Consultation discussion with other provider:Did you involve another provider (consultant, , pharmacy, etc.)?: No  Discharge. I prescribed additional prescription strength medication(s) as charted. See documentation for any additional details.    MIPS: Not Applicable    MEDICATIONS GIVEN IN THE EMERGENCY:  Medications   alum & mag hydroxide-simethicone (MAALOX) suspension 15 mL (15 mLs Oral $Given 12/8/24 0741)   famotidine (PEPCID) injection 20 mg (20 mg Intravenous $Given 12/8/24 0740)   ondansetron (ZOFRAN) injection 4 mg (4 mg Intravenous $Given 12/8/24 0739)   potassium chloride (KLOR-CON) Packet 40 mEq (40 mEq Oral $Given 12/8/24 0823)   potassium chloride 10 mEq in 100 mL sterile water infusion (0 mEq Intravenous Stopped 12/8/24 1015)   sodium chloride 0.9% BOLUS 500 mL (0 mLs Intravenous Stopped 12/8/24 1015)   iopamidol (ISOVUE-370) solution 80 mL (80 mLs Intravenous $Given 12/8/24 0847)       NEW PRESCRIPTIONS STARTED AT TODAY'S ER VISIT:  New Prescriptions    No medications on file        =================================================================  HPI    Patient information was obtained from: Patient and patient's   Use of : Yes (Phone) - Language: Germaine Ross is a 53 year old female with a past medical history of depression, anxiety, hypertriglyceridemia who presents to this emergency department by walk-in for  "evaluation of abdominal pain.     Per chart review: Patient was seen 11/22/2024 at [facility] for evaluation of palpitations and abdominal pain. Regarding abdominal pain, patient noted ongoing sharp left-sided pain with an acidic burning sensation with eating; this had not changed recently and she was not taking any medications for this as she is afraid of doing so.    Per patient's : Patient is experiencing epigastric abdominal pain with a burning, hot characteristic and a bitter/sour taste after eating; this has been affecting her ability to sleep. She has had this same issue previously, however, presents today after an episode of emesis earlier this morning. Patient's  further notes that the patient has had muscle tightness, and a mild headache as well. She took melatonin and metamucil either last evening or this morning prior to arrival. Patient has previously had her gallbladder removed.     Patient's  denies diarrhea, fever, hematemesis, hematochezia, dark stools or any other symptoms at this time. She has not taken any medications for heartburn. Patient does not drink alcohol.     Per patient: Patient reports epigastric pain which is causing her the sensation of upper abdominal muscle tightness to the point that she has been unable to sleep for the past 2 nights. She further notes numbness/tingling to her hands and feet bilaterally    Patient denies urinary symptoms or any other symptoms at this time.     PHYSICAL EXAM:    Vitals: /72   Pulse 87   Temp 98.5  F (36.9  C) (Oral)   Resp 18   Ht 1.422 m (4' 8\")   Wt 53.1 kg (117 lb)   SpO2 98%   BMI 26.23 kg/m     Constitutional: Well developed, well nourished. No acute distress.  HENT: Normocephalic, atraumatic. Neck-gross ROM intact.   Eyes: Pupils mid-range, sclera white  Respiratory: CTAB, no respiratory distress  Cardiovascular: Normal heart rate, regular rhythm. No lower extremity edema. " Extremities warm and well perfused  GI: Soft, not distended, epigastric tenderness  Musculoskeletal: Moving extremities intentionally and without pain. No obvious deformity.  Skin: Warm, dry, no rash.  Neuro: Alert and oriented, CN II-XII grossly intact, speech clear. 5/5 strength in upper and lower extremities. Sensation to light touch intact in all 4 extremities. Normal patella reflexes.    LAB:  All pertinent labs reviewed and interpreted.  Labs Ordered and Resulted from Time of ED Arrival to Time of ED Departure   BASIC METABOLIC PANEL - Abnormal       Result Value    Sodium 128 (*)     Potassium 2.7 (*)     Chloride 93 (*)     Carbon Dioxide (CO2) 24      Anion Gap 11      Urea Nitrogen 8.5      Creatinine 0.62      GFR Estimate >90      Calcium 8.6 (*)     Glucose 110 (*)    LIPASE - Abnormal    Lipase 12 (*)    CBC WITH PLATELETS AND DIFFERENTIAL - Abnormal    WBC Count 7.2      RBC Count 3.90      Hemoglobin 11.9      Hematocrit 33.9 (*)     MCV 87      MCH 30.5      MCHC 35.1      RDW 12.0      Platelet Count 188      % Neutrophils 67      % Lymphocytes 27      % Monocytes 5      % Eosinophils 0      % Basophils 0      % Immature Granulocytes 0      NRBCs per 100 WBC 0      Absolute Neutrophils 4.8      Absolute Lymphocytes 1.9      Absolute Monocytes 0.4      Absolute Eosinophils 0.0      Absolute Basophils 0.0      Absolute Immature Granulocytes 0.0      Absolute NRBCs 0.0     BASIC METABOLIC PANEL - Abnormal    Sodium 133 (*)     Potassium 3.7      Chloride 100      Carbon Dioxide (CO2) 24      Anion Gap 9      Urea Nitrogen 7.0      Creatinine 0.60      GFR Estimate >90      Calcium 8.7 (*)     Glucose 105 (*)    HEPATIC FUNCTION PANEL - Normal    Protein Total 6.5      Albumin 4.0      Bilirubin Total 0.8      Alkaline Phosphatase 73      AST 26      ALT 23      Bilirubin Direct <0.20     TROPONIN T, HIGH SENSITIVITY - Normal    Troponin T, High Sensitivity <6     MAGNESIUM - Normal    Magnesium 1.8          RADIOLOGY:  CT Abdomen Pelvis w Contrast   Final Result   IMPRESSION:    1.  No acute abnormality in the abdomen or pelvis.      Chest XR,  PA & LAT             EKG:   Performed at: 12/8/2024 07:36  Impression:   Sinus rhythm  Rightward axis  Incomplete right bundle branch block  Nonspecific ST and T wave abnormality  Abnormal ECG  Rate: 71 BPM  Rhythm: Sinus rhythm  QRS Interval: 108 ms  QTc Interval: 424/460  Comparison: No previous ECGs available  I have independently reviewed and interpreted the EKG(s) documented above.     I, Bill Ardon, am serving as a scribe to document services personally performed by Dr. Allyson Roca based on my observation and the provider's statements to me. I, Allyson Roca MD attest that Bill Ardon is acting in a scribe capacity, has observed my performance of the services and has documented them in accordance with my direction.    Allyson Roca M.D.  Emergency Medicine  [facility] EMERGENCY DEPARTMENT  [address] [place] [id number] [phone]  Dept: [phone]      Allyson Roca MD  12/08/24 [id number]

## 2024-12-08 NOTE — DISCHARGE INSTRUCTIONS
Please follow up closely with your clinic doctor. They should recheck your electrolytes   Return to the Emergency Room with increased chest or abdominal pain, unable to keep down fluids, bloody stool/vomit or other worsening symptoms

## 2024-12-08 NOTE — ED TRIAGE NOTES
Pt arrives complaining of upper abdominal pain that started this morning. Pt states it feels like heartburn/indigestion. Pt did vomit this morning.

## 2024-12-27 LAB
ATRIAL RATE - MUSE: 71 BPM
DIASTOLIC BLOOD PRESSURE - MUSE: 76 MMHG
INTERPRETATION ECG - MUSE: NORMAL
P AXIS - MUSE: 71 DEGREES
PR INTERVAL - MUSE: 200 MS
QRS DURATION - MUSE: 108 MS
QT - MUSE: 424 MS
QTC - MUSE: 460 MS
R AXIS - MUSE: 94 DEGREES
SYSTOLIC BLOOD PRESSURE - MUSE: 121 MMHG
T AXIS - MUSE: 89 DEGREES
VENTRICULAR RATE- MUSE: 71 BPM